# Patient Record
Sex: MALE | Race: WHITE | Employment: OTHER | URBAN - METROPOLITAN AREA
[De-identification: names, ages, dates, MRNs, and addresses within clinical notes are randomized per-mention and may not be internally consistent; named-entity substitution may affect disease eponyms.]

---

## 2024-10-03 ENCOUNTER — HOSPITAL ENCOUNTER (OUTPATIENT)
Dept: GENERAL RADIOLOGY | Age: 79
Discharge: HOME | End: 2024-10-06
Payer: MEDICARE

## 2024-10-03 ENCOUNTER — HOSPITAL ENCOUNTER (OUTPATIENT)
Dept: ULTRASOUND IMAGING | Age: 79
Discharge: HOME | End: 2024-10-06
Payer: MEDICARE

## 2024-10-03 DIAGNOSIS — I70.202 OCCLUSION OF LEFT FEMORAL ARTERY (HCC): ICD-10-CM

## 2024-10-03 PROCEDURE — 93922 UPR/L XTREMITY ART 2 LEVELS: CPT

## 2024-10-04 PROCEDURE — 93922 UPR/L XTREMITY ART 2 LEVELS: CPT | Performed by: RADIOLOGY

## 2024-10-05 ENCOUNTER — HOSPITAL ENCOUNTER (OUTPATIENT)
Dept: GENERAL RADIOLOGY | Age: 79
Discharge: HOME | End: 2024-10-08
Payer: MEDICARE

## 2024-10-05 DIAGNOSIS — M79.672 LEFT FOOT PAIN: ICD-10-CM

## 2024-10-05 PROCEDURE — 73620 X-RAY EXAM OF FOOT: CPT

## 2025-06-20 ENCOUNTER — HOSPITAL ENCOUNTER (OUTPATIENT)
Age: 80
Setting detail: OBSERVATION
Discharge: HOME | End: 2025-06-24
Attending: EMERGENCY MEDICINE | Admitting: FAMILY MEDICINE
Payer: MEDICARE

## 2025-06-20 DIAGNOSIS — Z79.01 CHRONIC ANTICOAGULATION: ICD-10-CM

## 2025-06-20 DIAGNOSIS — I48.0 PAROXYSMAL A-FIB (HCC): ICD-10-CM

## 2025-06-20 DIAGNOSIS — K62.5 RECTAL BLEEDING: Primary | ICD-10-CM

## 2025-06-20 PROBLEM — I10 PRIMARY HYPERTENSION: Chronic | Status: ACTIVE | Noted: 2025-06-20

## 2025-06-20 PROBLEM — K92.1 MELENA: Status: ACTIVE | Noted: 2025-06-20

## 2025-06-20 PROBLEM — N18.31 CKD STAGE 3A, GFR 45-59 ML/MIN (HCC): Status: ACTIVE | Noted: 2025-06-20

## 2025-06-20 PROBLEM — E78.5 HYPERLIPIDEMIA: Chronic | Status: ACTIVE | Noted: 2025-06-20

## 2025-06-20 PROBLEM — E11.9 DIABETES MELLITUS TYPE 2 IN NONOBESE (HCC): Chronic | Status: ACTIVE | Noted: 2025-06-20

## 2025-06-20 PROBLEM — N18.31 CKD STAGE 3A, GFR 45-59 ML/MIN (HCC): Chronic | Status: ACTIVE | Noted: 2025-06-20

## 2025-06-20 LAB
ALBUMIN SERPL-MCNC: 3.6 G/DL (ref 3.2–4.6)
ALBUMIN/GLOB SERPL: 1.3 (ref 1–1.9)
ALP SERPL-CCNC: 110 U/L (ref 40–129)
ALT SERPL-CCNC: 14 U/L (ref 8–55)
ANION GAP SERPL CALC-SCNC: 12 MMOL/L (ref 7–16)
AST SERPL-CCNC: 20 U/L (ref 15–37)
BASOPHILS # BLD: 0.05 K/UL (ref 0–0.2)
BASOPHILS NFR BLD: 0.5 % (ref 0–2)
BILIRUB SERPL-MCNC: 0.6 MG/DL (ref 0–1.2)
BUN SERPL-MCNC: 38 MG/DL (ref 8–23)
CALCIUM SERPL-MCNC: 10.1 MG/DL (ref 8.8–10.2)
CHLORIDE SERPL-SCNC: 111 MMOL/L (ref 98–107)
CO2 SERPL-SCNC: 19 MMOL/L (ref 20–29)
CREAT SERPL-MCNC: 1.54 MG/DL (ref 0.8–1.3)
DIFFERENTIAL METHOD BLD: ABNORMAL
EOSINOPHIL # BLD: 0.23 K/UL (ref 0–0.8)
EOSINOPHIL NFR BLD: 2.3 % (ref 0.5–7.8)
ERYTHROCYTE [DISTWIDTH] IN BLOOD BY AUTOMATED COUNT: 16.6 % (ref 11.9–14.6)
FERRITIN SERPL-MCNC: 22 NG/ML (ref 8–388)
GLOBULIN SER CALC-MCNC: 2.7 G/DL (ref 2.3–3.5)
GLUCOSE BLD STRIP.AUTO-MCNC: 77 MG/DL (ref 65–100)
GLUCOSE SERPL-MCNC: 95 MG/DL (ref 70–99)
HCT VFR BLD AUTO: 40.4 % (ref 41.1–50.3)
HGB BLD-MCNC: 13 G/DL (ref 13.6–17.2)
IMM GRANULOCYTES # BLD AUTO: 0.06 K/UL (ref 0–0.5)
IMM GRANULOCYTES NFR BLD AUTO: 0.6 % (ref 0–5)
INR PPP: 1.2
IRON SATN MFR SERPL: 9 % (ref 20–50)
IRON SERPL-MCNC: 44 UG/DL (ref 35–100)
LYMPHOCYTES # BLD: 2.27 K/UL (ref 0.5–4.6)
LYMPHOCYTES NFR BLD: 23.2 % (ref 13–44)
MCH RBC QN AUTO: 26.6 PG (ref 26.1–32.9)
MCHC RBC AUTO-ENTMCNC: 32.2 G/DL (ref 31.4–35)
MCV RBC AUTO: 82.6 FL (ref 82–102)
MONOCYTES # BLD: 0.75 K/UL (ref 0.1–1.3)
MONOCYTES NFR BLD: 7.7 % (ref 4–12)
NEUTS SEG # BLD: 6.43 K/UL (ref 1.7–8.2)
NEUTS SEG NFR BLD: 65.7 % (ref 43–78)
NRBC # BLD: 0 K/UL (ref 0–0.2)
PLATELET # BLD AUTO: 158 K/UL (ref 150–450)
PMV BLD AUTO: 10.8 FL (ref 9.4–12.3)
POTASSIUM SERPL-SCNC: 4.7 MMOL/L (ref 3.5–5.1)
PROT SERPL-MCNC: 6.3 G/DL (ref 6.3–8.2)
PROTHROMBIN TIME: 15.7 SEC (ref 11.3–14.9)
RBC # BLD AUTO: 4.89 M/UL (ref 4.23–5.6)
SERVICE CMNT-IMP: NORMAL
SODIUM SERPL-SCNC: 142 MMOL/L (ref 136–145)
TIBC SERPL-MCNC: 465 UG/DL (ref 240–450)
UIBC SERPL-MCNC: 421 UG/DL (ref 112–347)
WBC # BLD AUTO: 9.8 K/UL (ref 4.3–11.1)

## 2025-06-20 PROCEDURE — 82962 GLUCOSE BLOOD TEST: CPT

## 2025-06-20 PROCEDURE — 93005 ELECTROCARDIOGRAM TRACING: CPT | Performed by: EMERGENCY MEDICINE

## 2025-06-20 PROCEDURE — 82728 ASSAY OF FERRITIN: CPT

## 2025-06-20 PROCEDURE — 2500000003 HC RX 250 WO HCPCS: Performed by: EMERGENCY MEDICINE

## 2025-06-20 PROCEDURE — 86901 BLOOD TYPING SEROLOGIC RH(D): CPT

## 2025-06-20 PROCEDURE — 2580000003 HC RX 258: Performed by: EMERGENCY MEDICINE

## 2025-06-20 PROCEDURE — 99285 EMERGENCY DEPT VISIT HI MDM: CPT

## 2025-06-20 PROCEDURE — 96374 THER/PROPH/DIAG INJ IV PUSH: CPT

## 2025-06-20 PROCEDURE — 86923 COMPATIBILITY TEST ELECTRIC: CPT

## 2025-06-20 PROCEDURE — G0378 HOSPITAL OBSERVATION PER HR: HCPCS

## 2025-06-20 PROCEDURE — 86850 RBC ANTIBODY SCREEN: CPT

## 2025-06-20 PROCEDURE — 6360000002 HC RX W HCPCS: Performed by: EMERGENCY MEDICINE

## 2025-06-20 PROCEDURE — 83550 IRON BINDING TEST: CPT

## 2025-06-20 PROCEDURE — 85025 COMPLETE CBC W/AUTO DIFF WBC: CPT

## 2025-06-20 PROCEDURE — 80053 COMPREHEN METABOLIC PANEL: CPT

## 2025-06-20 PROCEDURE — 83540 ASSAY OF IRON: CPT

## 2025-06-20 PROCEDURE — 85610 PROTHROMBIN TIME: CPT

## 2025-06-20 PROCEDURE — 94761 N-INVAS EAR/PLS OXIMETRY MLT: CPT

## 2025-06-20 PROCEDURE — 86900 BLOOD TYPING SEROLOGIC ABO: CPT

## 2025-06-20 RX ORDER — DEXTROSE MONOHYDRATE 100 MG/ML
INJECTION, SOLUTION INTRAVENOUS CONTINUOUS PRN
Status: DISCONTINUED | OUTPATIENT
Start: 2025-06-20 | End: 2025-06-24 | Stop reason: HOSPADM

## 2025-06-20 RX ORDER — TAMSULOSIN HYDROCHLORIDE 0.4 MG/1
0.8 CAPSULE ORAL DAILY
Status: DISCONTINUED | OUTPATIENT
Start: 2025-06-21 | End: 2025-06-24 | Stop reason: HOSPADM

## 2025-06-20 RX ORDER — 0.9 % SODIUM CHLORIDE 0.9 %
1000 INTRAVENOUS SOLUTION INTRAVENOUS ONCE
Status: COMPLETED | OUTPATIENT
Start: 2025-06-20 | End: 2025-06-20

## 2025-06-20 RX ORDER — PROBENECID 500 MG/1
500 TABLET, FILM COATED ORAL DAILY
COMMUNITY

## 2025-06-20 RX ORDER — ACETAMINOPHEN 650 MG/1
650 SUPPOSITORY RECTAL EVERY 6 HOURS PRN
Status: DISCONTINUED | OUTPATIENT
Start: 2025-06-20 | End: 2025-06-24 | Stop reason: HOSPADM

## 2025-06-20 RX ORDER — DAPAGLIFLOZIN 10 MG/1
10 TABLET, FILM COATED ORAL EVERY MORNING
COMMUNITY

## 2025-06-20 RX ORDER — POTASSIUM CHLORIDE 750 MG/1
10 CAPSULE, EXTENDED RELEASE ORAL 2 TIMES DAILY
COMMUNITY

## 2025-06-20 RX ORDER — DILTIAZEM HYDROCHLORIDE 120 MG/1
120 CAPSULE, COATED, EXTENDED RELEASE ORAL DAILY
Status: DISCONTINUED | OUTPATIENT
Start: 2025-06-21 | End: 2025-06-24 | Stop reason: HOSPADM

## 2025-06-20 RX ORDER — CLONAZEPAM 1 MG/1
1 TABLET ORAL 2 TIMES DAILY PRN
Status: DISCONTINUED | OUTPATIENT
Start: 2025-06-20 | End: 2025-06-24 | Stop reason: HOSPADM

## 2025-06-20 RX ORDER — METFORMIN HYDROCHLORIDE 500 MG/1
500 TABLET, EXTENDED RELEASE ORAL
COMMUNITY

## 2025-06-20 RX ORDER — ERGOCALCIFEROL 1.25 MG/1
50000 CAPSULE, LIQUID FILLED ORAL WEEKLY
COMMUNITY

## 2025-06-20 RX ORDER — ONDANSETRON 2 MG/ML
4 INJECTION INTRAMUSCULAR; INTRAVENOUS EVERY 6 HOURS PRN
Status: DISCONTINUED | OUTPATIENT
Start: 2025-06-20 | End: 2025-06-24 | Stop reason: HOSPADM

## 2025-06-20 RX ORDER — CLONAZEPAM 1 MG/1
1 TABLET ORAL 2 TIMES DAILY PRN
COMMUNITY

## 2025-06-20 RX ORDER — GLIPIZIDE 5 MG/1
5 TABLET, FILM COATED, EXTENDED RELEASE ORAL DAILY
COMMUNITY

## 2025-06-20 RX ORDER — NADOLOL 40 MG/1
40 TABLET ORAL DAILY
Status: DISCONTINUED | OUTPATIENT
Start: 2025-06-21 | End: 2025-06-24 | Stop reason: HOSPADM

## 2025-06-20 RX ORDER — SODIUM CHLORIDE 0.9 % (FLUSH) 0.9 %
5-40 SYRINGE (ML) INJECTION PRN
Status: DISCONTINUED | OUTPATIENT
Start: 2025-06-20 | End: 2025-06-24 | Stop reason: HOSPADM

## 2025-06-20 RX ORDER — ACETAMINOPHEN 325 MG/1
650 TABLET ORAL EVERY 6 HOURS PRN
Status: DISCONTINUED | OUTPATIENT
Start: 2025-06-20 | End: 2025-06-24 | Stop reason: HOSPADM

## 2025-06-20 RX ORDER — INSULIN LISPRO 100 [IU]/ML
0-4 INJECTION, SOLUTION INTRAVENOUS; SUBCUTANEOUS
Status: DISCONTINUED | OUTPATIENT
Start: 2025-06-20 | End: 2025-06-24 | Stop reason: HOSPADM

## 2025-06-20 RX ORDER — LOSARTAN POTASSIUM 50 MG/1
50 TABLET ORAL DAILY
Status: DISCONTINUED | OUTPATIENT
Start: 2025-06-21 | End: 2025-06-24 | Stop reason: HOSPADM

## 2025-06-20 RX ORDER — TAMSULOSIN HYDROCHLORIDE 0.4 MG/1
0.8 CAPSULE ORAL DAILY
COMMUNITY

## 2025-06-20 RX ORDER — ONDANSETRON 4 MG/1
4 TABLET, ORALLY DISINTEGRATING ORAL EVERY 6 HOURS PRN
Status: DISCONTINUED | OUTPATIENT
Start: 2025-06-20 | End: 2025-06-24 | Stop reason: HOSPADM

## 2025-06-20 RX ORDER — SODIUM CHLORIDE 0.9 % (FLUSH) 0.9 %
5-40 SYRINGE (ML) INJECTION EVERY 12 HOURS SCHEDULED
Status: DISCONTINUED | OUTPATIENT
Start: 2025-06-20 | End: 2025-06-24 | Stop reason: HOSPADM

## 2025-06-20 RX ORDER — NADOLOL 20 MG/1
20 TABLET ORAL 2 TIMES DAILY
COMMUNITY

## 2025-06-20 RX ORDER — LOSARTAN POTASSIUM 50 MG/1
50 TABLET ORAL DAILY
COMMUNITY

## 2025-06-20 RX ORDER — CLONIDINE HYDROCHLORIDE 0.1 MG/1
0.1 TABLET ORAL 2 TIMES DAILY
COMMUNITY

## 2025-06-20 RX ORDER — IBUPROFEN 600 MG/1
1 TABLET ORAL PRN
Status: DISCONTINUED | OUTPATIENT
Start: 2025-06-20 | End: 2025-06-24 | Stop reason: HOSPADM

## 2025-06-20 RX ORDER — SODIUM CHLORIDE 9 MG/ML
INJECTION, SOLUTION INTRAVENOUS PRN
Status: DISCONTINUED | OUTPATIENT
Start: 2025-06-20 | End: 2025-06-24 | Stop reason: HOSPADM

## 2025-06-20 RX ORDER — GLIPIZIDE 5 MG/1
5 TABLET ORAL
Status: DISCONTINUED | OUTPATIENT
Start: 2025-06-21 | End: 2025-06-24 | Stop reason: HOSPADM

## 2025-06-20 RX ORDER — METFORMIN HYDROCHLORIDE 500 MG/1
500 TABLET, EXTENDED RELEASE ORAL
Status: DISCONTINUED | OUTPATIENT
Start: 2025-06-21 | End: 2025-06-24 | Stop reason: HOSPADM

## 2025-06-20 RX ORDER — ROSUVASTATIN CALCIUM 10 MG/1
20 TABLET, COATED ORAL DAILY
Status: DISCONTINUED | OUTPATIENT
Start: 2025-06-21 | End: 2025-06-24 | Stop reason: HOSPADM

## 2025-06-20 RX ORDER — DILTIAZEM HYDROCHLORIDE 120 MG/1
120 CAPSULE, EXTENDED RELEASE ORAL DAILY
COMMUNITY

## 2025-06-20 RX ORDER — ROSUVASTATIN CALCIUM 20 MG/1
20 TABLET, COATED ORAL DAILY
COMMUNITY

## 2025-06-20 RX ADMIN — SODIUM CHLORIDE, PRESERVATIVE FREE 80 MG: 5 INJECTION INTRAVENOUS at 21:36

## 2025-06-20 RX ADMIN — SODIUM CHLORIDE 1000 ML: 0.9 INJECTION, SOLUTION INTRAVENOUS at 21:35

## 2025-06-20 ASSESSMENT — PAIN SCALES - GENERAL: PAINLEVEL_OUTOF10: 0

## 2025-06-20 ASSESSMENT — PAIN - FUNCTIONAL ASSESSMENT: PAIN_FUNCTIONAL_ASSESSMENT: 0-10

## 2025-06-20 ASSESSMENT — LIFESTYLE VARIABLES
HOW OFTEN DO YOU HAVE A DRINK CONTAINING ALCOHOL: 2-4 TIMES A MONTH
HOW MANY STANDARD DRINKS CONTAINING ALCOHOL DO YOU HAVE ON A TYPICAL DAY: 1 OR 2

## 2025-06-20 NOTE — ED TRIAGE NOTES
Patient arrived with a complaint of rectal bleeding for 2 days. Patient reports of dark blood and clots present. Patient reports having his parts of his colon removed post colonoscopy. Patient reports of urine frequency as well. Feeling dizzy, weak and tired. Patient reports of feeling full and going to the bathroom.     Denies chest pain,  Denies  n/v  Denies abdominal pain.

## 2025-06-20 NOTE — ED NOTES
Assumed care of pt at this time. Pt resting quietly in bed, attached to bedside monitor. Warm blankets provided. Pt denies questions and concerns at this time.        Bernie Silva RN  06/20/25 1953

## 2025-06-21 ENCOUNTER — ANESTHESIA EVENT (OUTPATIENT)
Dept: SURGERY | Age: 80
End: 2025-06-21
Payer: MEDICARE

## 2025-06-21 LAB
ANION GAP SERPL CALC-SCNC: 14 MMOL/L (ref 7–16)
BASOPHILS # BLD: 0.03 K/UL (ref 0–0.2)
BASOPHILS NFR BLD: 0.5 % (ref 0–2)
BUN SERPL-MCNC: 34 MG/DL (ref 8–23)
CALCIUM SERPL-MCNC: 9.2 MG/DL (ref 8.8–10.2)
CHLORIDE SERPL-SCNC: 114 MMOL/L (ref 98–107)
CO2 SERPL-SCNC: 13 MMOL/L (ref 20–29)
CREAT SERPL-MCNC: 1.44 MG/DL (ref 0.8–1.3)
DIFFERENTIAL METHOD BLD: ABNORMAL
EKG ATRIAL RATE: 0 BPM
EKG DIAGNOSIS: NORMAL
EKG Q-T INTERVAL: 386 MS
EKG QRS DURATION: 101 MS
EKG QTC CALCULATION (BAZETT): 408 MS
EKG R AXIS: -61 DEGREES
EKG T AXIS: 218 DEGREES
EKG VENTRICULAR RATE: 67 BPM
EOSINOPHIL # BLD: 0.15 K/UL (ref 0–0.8)
EOSINOPHIL NFR BLD: 2.3 % (ref 0.5–7.8)
ERYTHROCYTE [DISTWIDTH] IN BLOOD BY AUTOMATED COUNT: 16.2 % (ref 11.9–14.6)
EST. AVERAGE GLUCOSE BLD GHB EST-MCNC: 164 MG/DL
GLUCOSE BLD STRIP.AUTO-MCNC: 102 MG/DL (ref 65–100)
GLUCOSE BLD STRIP.AUTO-MCNC: 128 MG/DL (ref 65–100)
GLUCOSE BLD STRIP.AUTO-MCNC: 134 MG/DL (ref 65–100)
GLUCOSE BLD STRIP.AUTO-MCNC: 134 MG/DL (ref 65–100)
GLUCOSE BLD STRIP.AUTO-MCNC: 220 MG/DL (ref 65–100)
GLUCOSE SERPL-MCNC: 136 MG/DL (ref 70–99)
HBA1C MFR BLD: 7.4 % (ref 0–5.6)
HCT VFR BLD AUTO: 23.9 % (ref 41.1–50.3)
HCT VFR BLD AUTO: 25.9 % (ref 41.1–50.3)
HCT VFR BLD AUTO: 28 % (ref 41.1–50.3)
HCT VFR BLD AUTO: 28.5 % (ref 41.1–50.3)
HGB BLD-MCNC: 7.9 G/DL (ref 13.6–17.2)
HGB BLD-MCNC: 8.2 G/DL (ref 13.6–17.2)
HGB BLD-MCNC: 9.1 G/DL (ref 13.6–17.2)
HGB BLD-MCNC: 9.2 G/DL (ref 13.6–17.2)
IMM GRANULOCYTES # BLD AUTO: 0.04 K/UL (ref 0–0.5)
IMM GRANULOCYTES NFR BLD AUTO: 0.6 % (ref 0–5)
LYMPHOCYTES # BLD: 1.68 K/UL (ref 0.5–4.6)
LYMPHOCYTES NFR BLD: 25.7 % (ref 13–44)
MAGNESIUM SERPL-MCNC: 2 MG/DL (ref 1.8–2.4)
MCH RBC QN AUTO: 26.7 PG (ref 26.1–32.9)
MCHC RBC AUTO-ENTMCNC: 32.5 G/DL (ref 31.4–35)
MCV RBC AUTO: 82.1 FL (ref 82–102)
MONOCYTES # BLD: 0.53 K/UL (ref 0.1–1.3)
MONOCYTES NFR BLD: 8.1 % (ref 4–12)
NEUTS SEG # BLD: 4.1 K/UL (ref 1.7–8.2)
NEUTS SEG NFR BLD: 62.8 % (ref 43–78)
NRBC # BLD: 0 K/UL (ref 0–0.2)
PLATELET # BLD AUTO: 130 K/UL (ref 150–450)
PMV BLD AUTO: 10.9 FL (ref 9.4–12.3)
POTASSIUM SERPL-SCNC: 4.9 MMOL/L (ref 3.5–5.1)
RBC # BLD AUTO: 3.41 M/UL (ref 4.23–5.6)
SERVICE CMNT-IMP: ABNORMAL
SODIUM SERPL-SCNC: 141 MMOL/L (ref 136–145)
WBC # BLD AUTO: 6.5 K/UL (ref 4.3–11.1)

## 2025-06-21 PROCEDURE — 83036 HEMOGLOBIN GLYCOSYLATED A1C: CPT

## 2025-06-21 PROCEDURE — 85018 HEMOGLOBIN: CPT

## 2025-06-21 PROCEDURE — 85025 COMPLETE CBC W/AUTO DIFF WBC: CPT

## 2025-06-21 PROCEDURE — G0378 HOSPITAL OBSERVATION PER HR: HCPCS

## 2025-06-21 PROCEDURE — 2500000003 HC RX 250 WO HCPCS: Performed by: FAMILY MEDICINE

## 2025-06-21 PROCEDURE — 85014 HEMATOCRIT: CPT

## 2025-06-21 PROCEDURE — 2500000003 HC RX 250 WO HCPCS: Performed by: PHYSICIAN ASSISTANT

## 2025-06-21 PROCEDURE — 80048 BASIC METABOLIC PNL TOTAL CA: CPT

## 2025-06-21 PROCEDURE — 96376 TX/PRO/DX INJ SAME DRUG ADON: CPT

## 2025-06-21 PROCEDURE — 99222 1ST HOSP IP/OBS MODERATE 55: CPT | Performed by: INTERNAL MEDICINE

## 2025-06-21 PROCEDURE — 6370000000 HC RX 637 (ALT 250 FOR IP): Performed by: INTERNAL MEDICINE

## 2025-06-21 PROCEDURE — 83735 ASSAY OF MAGNESIUM: CPT

## 2025-06-21 PROCEDURE — 96375 TX/PRO/DX INJ NEW DRUG ADDON: CPT

## 2025-06-21 PROCEDURE — 6360000002 HC RX W HCPCS: Performed by: FAMILY MEDICINE

## 2025-06-21 PROCEDURE — 36415 COLL VENOUS BLD VENIPUNCTURE: CPT

## 2025-06-21 PROCEDURE — 93010 ELECTROCARDIOGRAM REPORT: CPT | Performed by: INTERNAL MEDICINE

## 2025-06-21 PROCEDURE — 6370000000 HC RX 637 (ALT 250 FOR IP): Performed by: FAMILY MEDICINE

## 2025-06-21 PROCEDURE — 82962 GLUCOSE BLOOD TEST: CPT

## 2025-06-21 RX ORDER — SODIUM CHLORIDE 9 MG/ML
INJECTION, SOLUTION INTRAVENOUS PRN
Status: CANCELLED | OUTPATIENT
Start: 2025-06-21

## 2025-06-21 RX ORDER — ONDANSETRON 2 MG/ML
4 INJECTION INTRAMUSCULAR; INTRAVENOUS
Status: CANCELLED | OUTPATIENT
Start: 2025-06-21

## 2025-06-21 RX ORDER — SODIUM CHLORIDE, SODIUM LACTATE, POTASSIUM CHLORIDE, CALCIUM CHLORIDE 600; 310; 30; 20 MG/100ML; MG/100ML; MG/100ML; MG/100ML
INJECTION, SOLUTION INTRAVENOUS CONTINUOUS
Status: CANCELLED | OUTPATIENT
Start: 2025-06-21

## 2025-06-21 RX ORDER — SODIUM CHLORIDE 0.9 % (FLUSH) 0.9 %
5-40 SYRINGE (ML) INJECTION EVERY 12 HOURS SCHEDULED
Status: CANCELLED | OUTPATIENT
Start: 2025-06-21

## 2025-06-21 RX ORDER — IBUPROFEN 600 MG/1
1 TABLET ORAL PRN
Status: CANCELLED | OUTPATIENT
Start: 2025-06-21

## 2025-06-21 RX ORDER — SODIUM CHLORIDE 0.9 % (FLUSH) 0.9 %
5-40 SYRINGE (ML) INJECTION PRN
Status: CANCELLED | OUTPATIENT
Start: 2025-06-21

## 2025-06-21 RX ORDER — INDOMETHACIN 25 MG/1
50 CAPSULE ORAL ONCE
Status: COMPLETED | OUTPATIENT
Start: 2025-06-21 | End: 2025-06-21

## 2025-06-21 RX ORDER — NALOXONE HYDROCHLORIDE 0.4 MG/ML
INJECTION, SOLUTION INTRAMUSCULAR; INTRAVENOUS; SUBCUTANEOUS PRN
Status: CANCELLED | OUTPATIENT
Start: 2025-06-21

## 2025-06-21 RX ORDER — LIDOCAINE HYDROCHLORIDE 10 MG/ML
1 INJECTION, SOLUTION INFILTRATION; PERINEURAL
Status: CANCELLED | OUTPATIENT
Start: 2025-06-21

## 2025-06-21 RX ORDER — DEXTROSE MONOHYDRATE 100 MG/ML
INJECTION, SOLUTION INTRAVENOUS CONTINUOUS PRN
Status: CANCELLED | OUTPATIENT
Start: 2025-06-21

## 2025-06-21 RX ADMIN — TAMSULOSIN HYDROCHLORIDE 0.8 MG: 0.4 CAPSULE ORAL at 09:08

## 2025-06-21 RX ADMIN — DILTIAZEM HYDROCHLORIDE 120 MG: 120 CAPSULE, EXTENDED RELEASE ORAL at 09:09

## 2025-06-21 RX ADMIN — LOSARTAN POTASSIUM 50 MG: 50 TABLET, FILM COATED ORAL at 09:08

## 2025-06-21 RX ADMIN — SODIUM CHLORIDE 40 MG: 9 INJECTION INTRAMUSCULAR; INTRAVENOUS; SUBCUTANEOUS at 09:07

## 2025-06-21 RX ADMIN — POLYETHYLENE GLYCOL-3350 AND ELECTROLYTES 4000 ML: 236; 6.74; 5.86; 2.97; 22.74 POWDER, FOR SOLUTION ORAL at 16:00

## 2025-06-21 RX ADMIN — NADOLOL 40 MG: 40 TABLET ORAL at 09:09

## 2025-06-21 RX ADMIN — SODIUM CHLORIDE, PRESERVATIVE FREE 10 ML: 5 INJECTION INTRAVENOUS at 09:25

## 2025-06-21 RX ADMIN — INSULIN LISPRO 1 UNITS: 100 INJECTION, SOLUTION INTRAVENOUS; SUBCUTANEOUS at 20:56

## 2025-06-21 RX ADMIN — SODIUM CHLORIDE 40 MG: 9 INJECTION INTRAMUSCULAR; INTRAVENOUS; SUBCUTANEOUS at 20:56

## 2025-06-21 RX ADMIN — ROSUVASTATIN CALCIUM 20 MG: 10 TABLET, FILM COATED ORAL at 09:08

## 2025-06-21 RX ADMIN — SODIUM BICARBONATE 50 MEQ: 84 INJECTION INTRAVENOUS at 12:48

## 2025-06-21 RX ADMIN — SODIUM CHLORIDE, PRESERVATIVE FREE 10 ML: 5 INJECTION INTRAVENOUS at 21:01

## 2025-06-21 NOTE — ED NOTES
TRANSFER - OUT REPORT:    Verbal report given to Felipe Méndez on [place]  being transferred to 6th floor for routine progression of patient care       Report consisted of patient's Situation, Background, Assessment and   Recommendations(SBAR).     Information from the following report(s) Nurse Handoff Report, ED Encounter Summary, ED SBAR, MAR, Recent Results, and Neuro Assessment was reviewed with the receiving nurse.    [place] Fall Assessment:    Presents to emergency department  because of falls (Syncope, seizure, or loss of consciousness): No  Age > 70: Yes  Altered Mental Status, Intoxication with alcohol or substance confusion (Disorientation, impaired judgment, poor safety awaremess, or inability to follow instructions): No  Impaired Mobility: Ambulates or transfers with assistive devices or assistance; Unable to ambulate or transer.: Yes  Nursing Judgement: Yes          Lines:   Peripheral IV 06/20/25 Right Forearm (Active)   Site Assessment Clean, dry & intact 06/20/25 2206   Line Status Flushed 06/20/25 2206   Line Care Connections checked and tightened 06/20/25 2206   Phlebitis Assessment No symptoms 06/20/25 2206   Infiltration Assessment 0 06/20/25 2206   Alcohol Cap Used Yes 06/20/25 2206   Dressing Status Clean, dry & intact 06/20/25 2206   Dressing Type Transparent 06/20/25 2206        Opportunity for questions and clarification was provided.      Patient transported with:  Bernie Adams RN  06/20/25 2212

## 2025-06-21 NOTE — PROGRESS NOTES
Hospitalist Progress Note   Admit Date:  2025  7:19 PM   Name:  Can Land   Age:  79 y.o.  Sex:  male  :  1945   MRN:  [id number]   Room:  [facility]/    Presenting/Chief Complaint: Rectal Bleeding     Reason(s) for Admission: Melena [K92.1]  Rectal bleeding [K62.5]  Chronic anticoagulation [Z79.01]  Paroxysmal A-fib (HCC) [I48.0]     Hospital Course:   Can Land is a 79 y.o. male with medical history of atrial fibrillation on apixaban, hypertension, HLD, DM2, CKD 3a, anxiety, BPH c/o melenotic stooling x 2 days.  Patient has associated dizziness and weakness. He denied hx of GIB, gastric ulcers; further denied hematemesis, coffee-grind emesis, hematochezia.    In ER, VS with T97.2, HR 77, RR 17, /93, 99% RA.  Labwork remarkable for CO2 19, creatinine 1.54.  H/h 13.0 / 40.4 respectively on CBC.  Hospitalist consulted for inpatient admission.     Subjective & 24hr Events:   \"I'm still dizzy; so what's the plan?\"  79y.o. WM sitting up in bed     Admits to dizziness and weakness; RN reports melanotic BM this AM.  Denies CP, dyspnea, HA, visual deficits.    Assessment & Plan:     Principal Problem:    Melena  - OAC held  - appreciate GI evaluation, plans for EGD / C-scope in AM  - transfuse if hgb < 7.0    Active Problems:    Atrial fibrillation / secondary hypercoagulable state  - apixaban held  - rate ~controlled       Hypertension  - BP ranges improved  - cont losartan as dosed      DM2  - holding metformin / glipizide  - sliding scale coverage as needed      CKD stage 3a, GFR 45-59 ml/min ([facility])  - avoid nephrotoxic rx  - metformin held      BPH  - flomax as dosed      Anticipated Discharge Arrangements:   Home    PT/OT evals ordered?  Not ordered; patient not expected to need rehab  Diet:  ADULT DIET; Clear Liquid; 3 carb choices (45 gm/meal)  VTE prophylaxis: SCD  Code status: Full Code      Non-peripheral Lines and Tubes (if present):          Telemetry (if

## 2025-06-21 NOTE — PLAN OF CARE
Problem: Chronic Conditions and Co-morbidities  Goal: Patient's chronic conditions and co-morbidity symptoms are monitored and maintained or improved  6/21/2025 1953 by Dev Franco RN  Outcome: Progressing  6/21/2025 0842 by Israel Gipson RN  Outcome: Progressing     Problem: Discharge Planning  Goal: Discharge to home or other facility with appropriate resources  6/21/2025 1953 by Dev Franco RN  Outcome: Progressing  6/21/2025 0842 by Israel Gipson RN  Outcome: Progressing     Problem: Pain  Goal: Verbalizes/displays adequate comfort level or baseline comfort level  6/21/2025 1953 by Dev Franco RN  Outcome: Progressing  6/21/2025 0842 by Israel Gipson RN  Outcome: Progressing     Problem: Safety - Adult  Goal: Free from fall injury  6/21/2025 1953 by Dev Franco RN  Outcome: Progressing  6/21/2025 0842 by Israel Gipson RN  Outcome: Progressing     Problem: Cardiovascular - Adult  Goal: Maintains optimal cardiac output and hemodynamic stability  6/21/2025 1953 by Dev Franco RN  Outcome: Progressing  6/21/2025 0842 by Israel Gipson RN  Outcome: Progressing     Problem: Skin/Tissue Integrity - Adult  Goal: Skin integrity remains intact  6/21/2025 1953 by Dev Franco RN  Outcome: Progressing  6/21/2025 0842 by Israel Gipson RN  Outcome: Progressing  Flowsheets (Taken 6/21/2025 0710)  Skin Integrity Remains Intact: Monitor for areas of redness and/or skin breakdown     Problem: Musculoskeletal - Adult  Goal: Return mobility to safest level of function  6/21/2025 1953 by Dev Franco RN  Outcome: Progressing  6/21/2025 0842 by Israel Gipson RN  Outcome: Progressing  Goal: Maintain proper alignment of affected body part  6/21/2025 1953 by Dev Franco RN  Outcome: Progressing  6/21/2025 0842 by Israel Gipson RN  Outcome: Progressing  Goal: Return ADL status to a safe level of function  6/21/2025 1953 by Dev Franco RN  Outcome: Progressing  6/21/2025 0842 by

## 2025-06-21 NOTE — CONSULTS
GASTROENTEROLOGY & INTERVENTIONAL ENDOSCOPY        Consult Requested By: Dr. Schwartz  Reason for Consult: GI Bleed  Primary GI MD: N/AAdmission date: 2025        Date of service: 2025      Patient name: Can Land  : 1945  MRN: [id number]    Subjective     History of Present Illness  Presents with complaint of multiple episodes of dark maroon blood per rectum over the past 2 days. Patient denies any abdominal pain. He has not had diarrhea. He reports never having this before. He is on Eliquis for paroxysmal A-fib.     He reports has been feeling very weak no nausea or vomiting.  No abdominal pain.  Having rectal bleeding.  Denies having bleeding like this previously.  Last colonoscopy was in  no pathology was noted per patient.  Her bright red episode of bleeding this morning        Patient denied any fever, chills, no chest pain, no SOB, no changes in urine color, no easy bruising, no neurological deficits.    Endoscopic studies:  Previous Colonoscopy: Yes   Previous EGD: No    GI Imaging studies:   -    Aspirin use: Yes    NSAID use: No  Plavix/Brillinta: No  Anticoagulation (warfarin or Eliquis or Xarelto or Pradaxa or any other): Yes        GI ROS:  Nausea:No  Vomiting:No  Heartburn/acid reflux:No  Abdominal pain: No  Chest pain: No  Dysphagia: No  Odynophagia: No  Diarrhea:No  Constipation:No  Incontinence of stool :No  Changes in bowel habit patterns: No  Vomiting blood or coffee ground :No  Black tarry stools/Melena: No,  Hematochezia/blood in stool:Yes  Jaundice: No  Ascites: No  Pruritis: No  Change in appetite: No  Unintentional weight loss: No  Early satiety: No      Review of Systems: A complete ROS was conducted - all pertinent positives are per the HPI, all others are negative.    No past medical history on file.    No past surgical history on file.    Social History     Social History Narrative    Not on file     Social History     Tobacco Use    Smoking status: Never

## 2025-06-21 NOTE — CONSENT
Informed Consent for Blood Component Transfusion Note    I have discussed with the patient the rationale for blood component transfusion; its benefits in treating or preventing fatigue, organ damage, or death; and its risk which includes mild transfusion reactions, rare risk of blood borne infection, or more serious but rare reactions. I have discussed the alternatives to transfusion, including the risk and consequences of not receiving transfusion. The patient had an opportunity to ask questions and had agreed to proceed with transfusion of blood components.    Electronically signed by JOSSELINE Dominguez on 6/21/25 at 4:49 PM EDT

## 2025-06-21 NOTE — H&P
Hospitalist History and Physical   Admit Date:  2025  7:19 PM   Name:  Can Land   Age:  79 y.o.  Sex:  male  :  1945   MRN:  [id number]   Room:  [facility]32/    Presenting/Chief Complaint: Rectal Bleeding     Reason(s) for Admission: Melena [K92.1]     History of Present Illness:   Can Land is a 79 y.o. male who presented to [facility] emergency department complaint of dark stools and passing blood clots for 2 days.  Symptoms associated with dizziness, weakness, and fatigue.  He denies nausea, vomiting, abdominal pain.  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  He has not had colonoscopy since that time.  He has no prior history of similar bleeding.      Vital signs on presentation were notable only for /93.  CMP was pertinent for bicarb 19, BUN 38, creatinine 1.5 (baseline 1.2-1.4).  CBC notable for hemoglobin 13.0 with normocytic normochromic indices Hb was 11.8 on 6/10/2024).  ER provider ordered 1 L normal saline bolus, Protonix 80 mg IV x 1, and requested hospitalist admission for further evaluation and management.    Assessment & Plan:     Melena/maroon stools  He will be allowed clear liquid diet this evening, then n.p.o. after midnight except for medications.  GI consultation requested.  Start Protonix 40 mg IV twice daily.  Follow H&H's every 8 hours.    Paroxysmal atrial fibrillation  Continue nadolol and diltiazem as recommended by his cardiologist and most recent office note dated 2025.  Based on review of that note, patient apparently has a history of self-modifying his medication regimen, but we will continue medications as detailed in that provider's note.  He had a Zio patch placed at that visit, and per telephone note of results on 2025, atrial fibrillation was rate controlled.  Eliquis will be held in the setting of the

## 2025-06-21 NOTE — PROGRESS NOTES
Pt did have a large bloody bowel movement last night. Pt's Hgb noted to go from 13.0 to 9.1. Pt has been NPO since midnight. Rounds performed throughout shift. Pt denies needs at this time. Bed in low position, locked and call light/personal items within reach.

## 2025-06-21 NOTE — PROGRESS NOTES
4 Eyes Skin Assessment     NAME:  Can Land  YOB: 1945  MEDICAL RECORD NUMBER:  [id number]    The patient is being assessed for  Admission    I agree that at least one RN has performed a thorough Head to Toe Skin Assessment on the patient. ALL assessment sites listed below have been assessed.      Areas assessed by both nurses:    Head, Face, Ears, Shoulders, Back, Chest, Arms, Elbows, Hands, Sacrum. Buttock, Coccyx, Ischium, and Legs. Feet and Heels        Does the Patient have a Wound? No noted wound(s)       [place] Prevention initiated by RN: No  Wound Care Orders initiated by RN: No    Pressure Injury (Stage 3,4, Unstageable, DTI, NWPT, and Complex wounds) if present, place Wound referral order by RN under : No    New Ostomies, if present place, Ostomy referral order under : No     Nurse 1 eSignature: Electronically signed by Dev Franco RN on 6/20/25 at 11:03 PM EDT    **SHARE this note so that the co-signing nurse can place an eSignature**    Nurse 2 eSignature: Electronically signed by Darshana Brown RN on 6/20/25 at 11:03 PM EDT

## 2025-06-21 NOTE — PROGRESS NOTES
TRANSFER - IN REPORT:    Verbal report received from Bernie castillo [place]  being received from ED for routine progression of patient care      Report consisted of patient's Situation, Background, Assessment and   Recommendations(SBAR).     Information from the following report(s) ED Encounter Summary and ED SBAR was reviewed with the receiving nurse.    Opportunity for questions and clarification was provided.      Assessment completed upon patient's arrival to unit and care assumed.

## 2025-06-21 NOTE — PLAN OF CARE
Problem: Chronic Conditions and Co-morbidities  Goal: Patient's chronic conditions and co-morbidity symptoms are monitored and maintained or improved  Outcome: Progressing     Problem: Discharge Planning  Goal: Discharge to home or other facility with appropriate resources  Outcome: Progressing     Problem: Pain  Goal: Verbalizes/displays adequate comfort level or baseline comfort level  Outcome: Progressing     Problem: Safety - Adult  Goal: Free from fall injury  Outcome: Progressing     Problem: Cardiovascular - Adult  Goal: Maintains optimal cardiac output and hemodynamic stability  Outcome: Progressing     Problem: Skin/Tissue Integrity - Adult  Goal: Skin integrity remains intact  Outcome: Progressing     Problem: Musculoskeletal - Adult  Goal: Return mobility to safest level of function  Outcome: Progressing  Goal: Maintain proper alignment of affected body part  Outcome: Progressing  Goal: Return ADL status to a safe level of function  Outcome: Progressing     Problem: Gastrointestinal - Adult  Goal: Maintains or returns to baseline bowel function  Outcome: Progressing  Goal: Maintains adequate nutritional intake  Outcome: Progressing     Problem: Hematologic - Adult  Goal: Maintains hematologic stability  Outcome: Progressing

## 2025-06-21 NOTE — PROGRESS NOTES
Hourly rounds performed this shift. Bed lowered and locked. Call light within reach. All needs met at this time. Bedside shift report will be given to oncoming nurse. Pt became symptomatic earlier during the shift c/o SOB and dizziness. Provider made aware. STAT H&H drawn and came back at 9.2. H&H's are now timed for Q6hrs. Pt is only symptomatic when using the bathroom. Bedside commode is in use now and pt is tolerating transport well. Pt has started [place] and tolerating.

## 2025-06-21 NOTE — ED PROVIDER NOTES
(MICRO-K) 10 MEQ extended release capsule Take 1 capsule by mouth 2 times daily      losartan (COZAAR) 50 MG tablet Take 1 tablet by mouth daily      glipiZIDE (GLUCOTROL XL) 5 MG extended release tablet Take 1 tablet by mouth daily      cloNIDine (CATAPRES) 0.1 MG tablet Take 1 tablet by mouth 2 times daily      clonazePAM (KLONOPIN) 1 MG tablet Take 1 tablet by mouth 2 times daily as needed for Anxiety. Max Daily Amount: 2 mg      metFORMIN (GLUCOPHAGE-XR) 500 MG extended release tablet Take 1 tablet by mouth daily (with breakfast)      nadolol (CORGARD) 20 MG tablet Take 1 tablet by mouth in the morning and at bedtime      tamsulosin (FLOMAX) 0.4 MG capsule Take 2 capsules by mouth daily      probenecid (BENEMID) 500 MG tablet Take 1 tablet by mouth Daily      dapagliflozin (FARXIGA) 10 MG tablet Take 1 tablet by mouth every morning      dilTIAZem (CARDIZEM 12 HR) 120 MG extended release capsule Take 1 capsule by mouth Daily      vitamin D (ERGOCALCIFEROL) 1.25 MG (43790 UT) CAPS capsule Take 1 capsule by mouth once a week      rosuvastatin (CRESTOR) 20 MG tablet Take 1 tablet by mouth daily              Results from this emergency department visit:      Results for orders placed or performed during the hospital encounter of 06/20/25   CBC with Auto Differential   Result Value Ref Range    WBC 9.8 4.3 - 11.1 K/uL    RBC 4.89 4.23 - 5.6 M/uL    Hemoglobin 13.0 (L) 13.6 - 17.2 g/dL    Hematocrit 40.4 (L) 41.1 - 50.3 %    MCV 82.6 82 - 102 FL    MCH 26.6 26.1 - 32.9 PG    MCHC 32.2 31.4 - 35.0 g/dL    RDW 16.6 (H) 11.9 - 14.6 %    Platelets 158 150 - 450 K/uL    MPV 10.8 9.4 - 12.3 FL    nRBC 0.00 0.0 - 0.2 K/uL    Differential Type AUTOMATED      Neutrophils % 65.7 43.0 - 78.0 %    Lymphocytes % 23.2 13.0 - 44.0 %    Monocytes % 7.7 4.0 - 12.0 %    Eosinophils % 2.3 0.5 - 7.8 %    Basophils % 0.5 0.0 - 2.0 %    Immature Granulocytes % 0.6 0.0 - 5.0 %    Neutrophils Absolute 6.43 1.70 - 8.20 K/UL    Lymphocytes

## 2025-06-21 NOTE — ANESTHESIA PRE PROCEDURE
Department of Anesthesiology  Preprocedure Note       Name:  Can Land   Age:  79 y.o.  :  1945                                          MRN:  [id number]         Date:  2025      Surgeon: Surgeon(s):  Zenobia Chahal MD    Procedure: Procedure(s):  ESOPHAGOGASTRODUODENOSCOPY  COLONOSCOPY DIAGNOSTIC    Medications prior to admission:   Prior to Admission medications    Medication Sig Start Date End Date Taking? Authorizing Provider   apixaban (ELIQUIS) 5 MG TABS tablet Take 1 tablet by mouth 2 times daily Indications: Atrial Fibrillation   Yes Josh Duncan MD   potassium chloride (MICRO-K) 10 MEQ extended release capsule Take 1 capsule by mouth 2 times daily   Yes Josh Duncan MD   losartan (COZAAR) 50 MG tablet Take 1 tablet by mouth daily   Yes Josh Duncan MD   glipiZIDE (GLUCOTROL XL) 5 MG extended release tablet Take 1 tablet by mouth daily   Yes Josh Duncan MD   cloNIDine (CATAPRES) 0.1 MG tablet Take 1 tablet by mouth 2 times daily   Yes Josh Duncan MD   clonazePAM (KLONOPIN) 1 MG tablet Take 1 tablet by mouth 2 times daily as needed for Anxiety. Max Daily Amount: 2 mg   Yes Josh Duncan MD   metFORMIN (GLUCOPHAGE-XR) 500 MG extended release tablet Take 1 tablet by mouth daily (with breakfast)   Yes Josh Duncan MD   nadolol (CORGARD) 20 MG tablet Take 1 tablet by mouth in the morning and at bedtime   Yes Josh Duncan MD   tamsulosin (FLOMAX) 0.4 MG capsule Take 2 capsules by mouth daily   Yes Josh Duncan MD   probenecid (BENEMID) 500 MG tablet Take 1 tablet by mouth Daily   Yes ProviderJosh MD   dapagliflozin (FARXIGA) 10 MG tablet Take 1 tablet by mouth every morning   Yes Josh Duncan MD   dilTIAZem (CARDIZEM 12 HR) 120 MG extended release capsule Take 1 capsule by mouth Daily   Yes Josh Duncan MD   vitamin D (ERGOCALCIFEROL) 1.25 MG (58120 UT) CAPS capsule Take 1 capsule by mouth 
Presents with complaint of multiple episodes of dark maroon blood per rectum over the past 2 days. Patient denies any abdominal pain. He has not had diarrhea. He reports never having this before. He is on Eliquis for paroxysmal A-fib. .   Endo/Other: Negative Endo/Other ROS                    Abdominal:             Vascular: negative vascular ROS.         Other Findings:       Anesthesia Plan        Donavan Clement MD   6/21/2025

## 2025-06-22 ENCOUNTER — ANESTHESIA (OUTPATIENT)
Dept: SURGERY | Age: 80
End: 2025-06-22
Payer: MEDICARE

## 2025-06-22 LAB
ANION GAP SERPL CALC-SCNC: 20 MMOL/L (ref 7–16)
BASOPHILS # BLD: 0.03 K/UL (ref 0–0.2)
BASOPHILS NFR BLD: 0.3 % (ref 0–2)
BUN SERPL-MCNC: 37 MG/DL (ref 8–23)
CALCIUM SERPL-MCNC: 9 MG/DL (ref 8.8–10.2)
CHLORIDE SERPL-SCNC: 106 MMOL/L (ref 98–107)
CO2 SERPL-SCNC: 15 MMOL/L (ref 20–29)
CREAT SERPL-MCNC: 1.65 MG/DL (ref 0.8–1.3)
DIFFERENTIAL METHOD BLD: ABNORMAL
EOSINOPHIL # BLD: 0.04 K/UL (ref 0–0.8)
EOSINOPHIL NFR BLD: 0.4 % (ref 0.5–7.8)
ERYTHROCYTE [DISTWIDTH] IN BLOOD BY AUTOMATED COUNT: 16.3 % (ref 11.9–14.6)
GLUCOSE BLD STRIP.AUTO-MCNC: 152 MG/DL (ref 65–100)
GLUCOSE BLD STRIP.AUTO-MCNC: 179 MG/DL (ref 65–100)
GLUCOSE BLD STRIP.AUTO-MCNC: 188 MG/DL (ref 65–100)
GLUCOSE BLD STRIP.AUTO-MCNC: 260 MG/DL (ref 65–100)
GLUCOSE BLD STRIP.AUTO-MCNC: 274 MG/DL (ref 65–100)
GLUCOSE SERPL-MCNC: 124 MG/DL (ref 70–99)
HCT VFR BLD AUTO: 21.6 % (ref 41.1–50.3)
HCT VFR BLD AUTO: 26.4 % (ref 41.1–50.3)
HCT VFR BLD AUTO: 26.6 % (ref 41.1–50.3)
HGB BLD-MCNC: 7.3 G/DL (ref 13.6–17.2)
HGB BLD-MCNC: 8.3 G/DL (ref 13.6–17.2)
HGB BLD-MCNC: 8.7 G/DL (ref 13.6–17.2)
IMM GRANULOCYTES # BLD AUTO: 0.07 K/UL (ref 0–0.5)
IMM GRANULOCYTES NFR BLD AUTO: 0.8 % (ref 0–5)
LYMPHOCYTES # BLD: 2.23 K/UL (ref 0.5–4.6)
LYMPHOCYTES NFR BLD: 24.9 % (ref 13–44)
MCH RBC QN AUTO: 26.6 PG (ref 26.1–32.9)
MCHC RBC AUTO-ENTMCNC: 31.4 G/DL (ref 31.4–35)
MCV RBC AUTO: 84.6 FL (ref 82–102)
MONOCYTES # BLD: 0.93 K/UL (ref 0.1–1.3)
MONOCYTES NFR BLD: 10.4 % (ref 4–12)
NEUTS SEG # BLD: 5.66 K/UL (ref 1.7–8.2)
NEUTS SEG NFR BLD: 63.2 % (ref 43–78)
NRBC # BLD: 0 K/UL (ref 0–0.2)
PLATELET # BLD AUTO: 150 K/UL (ref 150–450)
PMV BLD AUTO: 10.5 FL (ref 9.4–12.3)
POTASSIUM SERPL-SCNC: 4 MMOL/L (ref 3.5–5.1)
RBC # BLD AUTO: 3.12 M/UL (ref 4.23–5.6)
SERVICE CMNT-IMP: ABNORMAL
SODIUM SERPL-SCNC: 141 MMOL/L (ref 136–145)
WBC # BLD AUTO: 9 K/UL (ref 4.3–11.1)

## 2025-06-22 PROCEDURE — 85018 HEMOGLOBIN: CPT

## 2025-06-22 PROCEDURE — 7100000010 HC PHASE II RECOVERY - FIRST 15 MIN: Performed by: INTERNAL MEDICINE

## 2025-06-22 PROCEDURE — 2709999900 HC NON-CHARGEABLE SUPPLY: Performed by: INTERNAL MEDICINE

## 2025-06-22 PROCEDURE — 3609017100 HC EGD: Performed by: INTERNAL MEDICINE

## 2025-06-22 PROCEDURE — 36415 COLL VENOUS BLD VENIPUNCTURE: CPT

## 2025-06-22 PROCEDURE — 2580000003 HC RX 258: Performed by: ANESTHESIOLOGY

## 2025-06-22 PROCEDURE — 6360000002 HC RX W HCPCS: Performed by: FAMILY MEDICINE

## 2025-06-22 PROCEDURE — 7100000011 HC PHASE II RECOVERY - ADDTL 15 MIN: Performed by: INTERNAL MEDICINE

## 2025-06-22 PROCEDURE — 3700000000 HC ANESTHESIA ATTENDED CARE: Performed by: INTERNAL MEDICINE

## 2025-06-22 PROCEDURE — 85014 HEMATOCRIT: CPT

## 2025-06-22 PROCEDURE — 43235 EGD DIAGNOSTIC BRUSH WASH: CPT | Performed by: INTERNAL MEDICINE

## 2025-06-22 PROCEDURE — 6360000002 HC RX W HCPCS: Performed by: ANESTHESIOLOGY

## 2025-06-22 PROCEDURE — 85025 COMPLETE CBC W/AUTO DIFF WBC: CPT

## 2025-06-22 PROCEDURE — 82962 GLUCOSE BLOOD TEST: CPT

## 2025-06-22 PROCEDURE — 6370000000 HC RX 637 (ALT 250 FOR IP): Performed by: INTERNAL MEDICINE

## 2025-06-22 PROCEDURE — 80048 BASIC METABOLIC PNL TOTAL CA: CPT

## 2025-06-22 PROCEDURE — G0378 HOSPITAL OBSERVATION PER HR: HCPCS

## 2025-06-22 PROCEDURE — 3700000001 HC ADD 15 MINUTES (ANESTHESIA): Performed by: INTERNAL MEDICINE

## 2025-06-22 PROCEDURE — 2500000003 HC RX 250 WO HCPCS: Performed by: FAMILY MEDICINE

## 2025-06-22 PROCEDURE — 3609027000 HC COLONOSCOPY: Performed by: INTERNAL MEDICINE

## 2025-06-22 PROCEDURE — 45378 DIAGNOSTIC COLONOSCOPY: CPT | Performed by: INTERNAL MEDICINE

## 2025-06-22 PROCEDURE — 6370000000 HC RX 637 (ALT 250 FOR IP)

## 2025-06-22 PROCEDURE — 2500000003 HC RX 250 WO HCPCS: Performed by: PHYSICIAN ASSISTANT

## 2025-06-22 PROCEDURE — 6370000000 HC RX 637 (ALT 250 FOR IP): Performed by: FAMILY MEDICINE

## 2025-06-22 RX ORDER — INDOMETHACIN 25 MG/1
50 CAPSULE ORAL ONCE
Status: COMPLETED | OUTPATIENT
Start: 2025-06-22 | End: 2025-06-22

## 2025-06-22 RX ORDER — SODIUM CHLORIDE, SODIUM LACTATE, POTASSIUM CHLORIDE, CALCIUM CHLORIDE 600; 310; 30; 20 MG/100ML; MG/100ML; MG/100ML; MG/100ML
INJECTION, SOLUTION INTRAVENOUS
Status: DISCONTINUED | OUTPATIENT
Start: 2025-06-22 | End: 2025-06-22 | Stop reason: SDUPTHER

## 2025-06-22 RX ORDER — SODIUM CHLORIDE, SODIUM LACTATE, POTASSIUM CHLORIDE, CALCIUM CHLORIDE 600; 310; 30; 20 MG/100ML; MG/100ML; MG/100ML; MG/100ML
INJECTION, SOLUTION INTRAVENOUS
Status: COMPLETED | OUTPATIENT
Start: 2025-06-22 | End: 2025-06-22

## 2025-06-22 RX ORDER — PROPOFOL 10 MG/ML
INJECTION, EMULSION INTRAVENOUS
Status: DISCONTINUED | OUTPATIENT
Start: 2025-06-22 | End: 2025-06-22 | Stop reason: SDUPTHER

## 2025-06-22 RX ORDER — LIDOCAINE HYDROCHLORIDE 20 MG/ML
INJECTION, SOLUTION EPIDURAL; INFILTRATION; INTRACAUDAL; PERINEURAL
Status: DISCONTINUED | OUTPATIENT
Start: 2025-06-22 | End: 2025-06-22 | Stop reason: SDUPTHER

## 2025-06-22 RX ADMIN — NADOLOL 40 MG: 40 TABLET ORAL at 11:46

## 2025-06-22 RX ADMIN — APIXABAN 5 MG: 5 TABLET, FILM COATED ORAL at 20:50

## 2025-06-22 RX ADMIN — APIXABAN 5 MG: 5 TABLET, FILM COATED ORAL at 12:49

## 2025-06-22 RX ADMIN — PROPOFOL 80 MG: 10 INJECTION, EMULSION INTRAVENOUS at 09:11

## 2025-06-22 RX ADMIN — TAMSULOSIN HYDROCHLORIDE 0.8 MG: 0.4 CAPSULE ORAL at 11:46

## 2025-06-22 RX ADMIN — LIDOCAINE HYDROCHLORIDE 60 MG: 20 INJECTION, SOLUTION EPIDURAL; INFILTRATION; INTRACAUDAL; PERINEURAL at 09:08

## 2025-06-22 RX ADMIN — SODIUM BICARBONATE 50 MEQ: 84 INJECTION INTRAVENOUS at 11:45

## 2025-06-22 RX ADMIN — PROPOFOL 20 MG: 10 INJECTION, EMULSION INTRAVENOUS at 09:14

## 2025-06-22 RX ADMIN — PROPOFOL 20 MG: 10 INJECTION, EMULSION INTRAVENOUS at 09:13

## 2025-06-22 RX ADMIN — SODIUM CHLORIDE, SODIUM LACTATE, POTASSIUM CHLORIDE, AND CALCIUM CHLORIDE: 600; 310; 30; 20 INJECTION, SOLUTION INTRAVENOUS at 09:02

## 2025-06-22 RX ADMIN — SODIUM CHLORIDE 40 MG: 9 INJECTION INTRAMUSCULAR; INTRAVENOUS; SUBCUTANEOUS at 20:50

## 2025-06-22 RX ADMIN — DILTIAZEM HYDROCHLORIDE 120 MG: 120 CAPSULE, EXTENDED RELEASE ORAL at 11:45

## 2025-06-22 RX ADMIN — SODIUM CHLORIDE, POTASSIUM CHLORIDE, SODIUM LACTATE AND CALCIUM CHLORIDE: 600; 310; 30; 20 INJECTION, SOLUTION INTRAVENOUS at 07:41

## 2025-06-22 RX ADMIN — DICLOFENAC SODIUM 2 G: 10 GEL TOPICAL at 21:26

## 2025-06-22 RX ADMIN — SODIUM CHLORIDE, PRESERVATIVE FREE 10 ML: 5 INJECTION INTRAVENOUS at 20:53

## 2025-06-22 RX ADMIN — LOSARTAN POTASSIUM 50 MG: 50 TABLET, FILM COATED ORAL at 11:46

## 2025-06-22 RX ADMIN — INSULIN LISPRO 2 UNITS: 100 INJECTION, SOLUTION INTRAVENOUS; SUBCUTANEOUS at 20:52

## 2025-06-22 RX ADMIN — SODIUM CHLORIDE 40 MG: 9 INJECTION INTRAMUSCULAR; INTRAVENOUS; SUBCUTANEOUS at 11:44

## 2025-06-22 RX ADMIN — PROPOFOL 160 MCG/KG/MIN: 10 INJECTION, EMULSION INTRAVENOUS at 09:12

## 2025-06-22 RX ADMIN — ROSUVASTATIN CALCIUM 20 MG: 10 TABLET, FILM COATED ORAL at 11:45

## 2025-06-22 ASSESSMENT — PAIN - FUNCTIONAL ASSESSMENT: PAIN_FUNCTIONAL_ASSESSMENT: 0-10

## 2025-06-22 ASSESSMENT — PAIN SCALES - WONG BAKER: WONGBAKER_NUMERICALRESPONSE: NO HURT

## 2025-06-22 NOTE — PROGRESS NOTES
TRANSFER - IN REPORT:    Verbal report received from ROBERT Salmon on [place]  being received from PACU for routine progression of patient care      Report consisted of patient's Situation, Background, Assessment and   Recommendations(SBAR).     Information from the following report(s) Nurse Handoff Report was reviewed with the receiving nurse.    Opportunity for questions and clarification was provided.      Assessment completed upon patient's arrival to unit and care assumed.

## 2025-06-22 NOTE — PERIOP NOTE
TRANSFER - OUT REPORT:    Verbal report given to ROBERT Park on [place]  being transferred to [facility] for routine progression of patient care       Report consisted of patient's Situation, Background, Assessment and   Recommendations(SBAR).     Information from the following report(s) Nurse Handoff Report, Adult Overview, Surgery Report, and Cardiac Rhythm controlled afib was reviewed with the receiving nurse.           Lines:   Peripheral IV 06/20/25 Right Forearm (Active)   Site Assessment Clean, dry & intact 06/22/25 0938   Line Status Infusing 06/22/25 0938   Line Care Connections checked and tightened 06/22/25 0938   Phlebitis Assessment No symptoms 06/22/25 0938   Infiltration Assessment 0 06/22/25 0938   Alcohol Cap Used No 06/22/25 0736   Dressing Status Clean, dry & intact 06/22/25 0938   Dressing Type Transparent 06/22/25 0938        Opportunity for questions and clarification was provided.

## 2025-06-22 NOTE — PROGRESS NOTES
TRANSFER - OUT REPORT:    Verbal report given to Tahira JONES on [place]  being transferred to GI Lab for ordered procedure       Report consisted of patient's Situation, Background, Assessment and   Recommendations(SBAR).     Information from the following report(s) Nurse Handoff Report was reviewed with the receiving nurse.           Lines:   Peripheral IV 06/20/25 Right Forearm (Active)   Site Assessment Clean, dry & intact 06/21/25 1943   Line Status Normal saline locked 06/21/25 1943   Line Care Connections checked and tightened 06/21/25 1943   Phlebitis Assessment No symptoms 06/21/25 1943   Infiltration Assessment 0 06/21/25 1943   Alcohol Cap Used Yes 06/21/25 1943   Dressing Status Clean, dry & intact 06/21/25 1943   Dressing Type Transparent 06/21/25 1943        Opportunity for questions and clarification was provided.

## 2025-06-22 NOTE — OP NOTE
Procedure: EGD    Indication: Suspected GI bleed; melena    Date of Procedure: 6/22/2025    Patient profile: Refer to patient note in chart for documentation of history and physical    Providers: Zenobia Chahal MD    Referring MD: No ref. provider found    PCP: Kiana Meléndez APRN    Medicines: Monitored anesthesia care    Complication: No immediate complications.     Estimated blood loss: Minimal    Procedure: After the risks including, but not limited to medication reaction, infection, bleeding, perforation, missed lesions, benefits and alternatives of the procedure were discussed with the patient and all questions were answered, informed consent was obtained. The gastroscope was passed under direct vision throughout the procedure, the patient's blood pressure pulse and oxygen saturation were monitored continuously. The scope was introduced through the mouth and advanced to the 2nd portion of the duodenum. The endoscopy was performed without difficulty. The patient tolerated the procedure well.       Findings:   --The adult gastroscope was introduced from the mouth and advanced through the esophagus to the GE junction.   --The esophagus was normal. A non-obstructive Schatzki ring was noted.  --The scope was advanced to the stomach which was  normal in forward and retroflexed views. A hiatal hernia was noted. Minmal antral gastropathy was noted.  --Duodenum was normal to 2nd portion.    The scope was pulled back, and the procedure was subsequently ended.    Impression:  --Schatzki ring  --minimal gastritis    Recommendations:  -- Proceed to Colonoscopy    Zenobia Chahal MD  Gastroenterology and Interventional Endoscopy       Acitretin Counseling:  I discussed with the patient the risks of acitretin including but not limited to hair loss, dry lips/skin/eyes, liver damage, hyperlipidemia, depression/suicidal ideation, photosensitivity.  Serious rare side effects can include but are not limited to pancreatitis, pseudotumor cerebri, bony changes, clot formation/stroke/heart attack.  Patient understands that alcohol is contraindicated since it can result in liver toxicity and significantly prolong the elimination of the drug by many years.

## 2025-06-22 NOTE — OP NOTE
Procedure: Colonoscopy    Indication: GI bleeding    Date of Procedure: 6/22/2025    Patient profile: Refer to patient note in chart for documentation of history and physical    Providers: Zenobia Chahal MD    Referring MD: No ref. provider found    PCP: Kiana Meléndez APRN    Medicines: Monitored anesthesia care    Complication: No immediate complications.     Estimated blood loss: Minimal    Procedure: After the risks including, but not limited to medication reaction, infection, bleeding, perforation, missed lesions, benefits and alternatives of the procedure were discussed with the patient and all questions were answered, informed consent was obtained. The pediatric colonoscope was passed under direct vision throughout the procedure, the patient's blood pressure pulse and oxygen saturation were monitored continuously. The scope was introduced through the anal canal and advanced to the cecum / Surgical anastomosis. The endoscopy was performed without difficulty.The patient tolerated the procedure well.     Findings:     The pediatric colonoscope was introduced from the anal canal and advanced to the cecum. The quality of the bowel preparation was evaluated using the BBPS ([place] Bowel Preparation Scale) with scores of right colon = 2, transverse colon =3, and left colon = 2. The total BBPS score was 7. The rest of the entire examined colon was normal.     --There was evidence of a possible end to end surgical anastomosis in the cecum.  This was normal.  No evidence of large diverticular disease.  -- On retroflexion large hemorrhoids were noted, these were all internal hemorrhoids.  There were 4 total columns noted.  These appear to be grade 3.  I decided not to band this at this time given there was no obvious evidence of bleeding.      The scope was pulled back, and the procedure was subsequently ended.    Impression:  --Suspect patient's bleeding source is likely internal hemorrhoidal

## 2025-06-22 NOTE — PROGRESS NOTES
Hospitalist Progress Note   Admit Date:  2025  7:19 PM   Name:  Can Land   Age:  79 y.o.  Sex:  male  :  1945   MRN:  [id number]   Room:  [facility]/    Presenting/Chief Complaint: Rectal Bleeding     Reason(s) for Admission: Melena [K92.1]  Rectal bleeding [K62.5]  Chronic anticoagulation [Z79.01]  Paroxysmal A-fib (HCC) [I48.0]     Hospital Course:   Can Land is a 79 y.o. male with medical history of atrial fibrillation on apixaban, hypertension, HLD, DM2, CKD 3a, anxiety, BPH c/o melenotic stooling x 2 days.  Patient has associated dizziness and weakness. He denied hx of GIB, gastric ulcers; further denied hematemesis, coffee-grind emesis, hematochezia.    In ER, VS with T97.2, HR 77, RR 17, /93, 99% RA.  Labwork remarkable for CO2 19, creatinine 1.54.  H/h 13.0 / 40.4 respectively on CBC.  Hospitalist consulted for inpatient admission.     Pt taken to EGD / colonoscopy by Dr. Chahal and found with internal hemorrhoids without active bleeding.  Per GI okay to resume apixaban    Subjective & 24hr Events:   \"I'm not dizzy getting up anymore; can I go home?\" Pleasant 79y.o. WM sitting up on bedside commode    Denies byron blood per rectum / bloody stooling.  Further denies dizziness on ambulation this AM.      Assessment & Plan:     Principal Problem:    Melena  - okay from GI standpoint to resume OAC this evening  - EGD noted Schatzki ring, minimal gastritis; C-scope revealed non-bleeding hemorrhoids   - hgb ranges stable 9.2 -> 8.2 -> 7.9 -> 8.3 -> 8.7  - transfuse if hgb < 7.0    Active Problems:    Atrial fibrillation / secondary hypercoagulable state  - apixaban to resue this evening   - rate ~controlled       Hypertension  - BP ranges improved  - cont losartan as dosed      Anion gap metabolic acidosis  - worsened with bowel prep  - repeat sodium bicarb IV now  - f/u AM BMP      DM2  - cont holding metformin / glipizide  - sliding scale coverage as needed  - acceptable ranges

## 2025-06-22 NOTE — PROGRESS NOTES
Pt was unable to complete all of bowel prep. Pt had numerous stools throughout the night. Pt's most recent BM was watery with blood still present. Pt has had nothing to eat or drink except for bowel prep since midnight. Rounds performed throughout shift. Pt denies needs at this time. Bed in low position, locked and call light/personal items within reach.

## 2025-06-23 ENCOUNTER — ANESTHESIA EVENT (OUTPATIENT)
Dept: ENDOSCOPY | Age: 80
End: 2025-06-23
Payer: MEDICARE

## 2025-06-23 ENCOUNTER — ANESTHESIA (OUTPATIENT)
Dept: ENDOSCOPY | Age: 80
End: 2025-06-23
Payer: MEDICARE

## 2025-06-23 PROBLEM — K62.5 RECTAL BLEEDING: Status: ACTIVE | Noted: 2025-06-23

## 2025-06-23 LAB
ANION GAP SERPL CALC-SCNC: 11 MMOL/L (ref 7–16)
BASOPHILS # BLD: 0.02 K/UL (ref 0–0.2)
BASOPHILS NFR BLD: 0.4 % (ref 0–2)
BUN SERPL-MCNC: 35 MG/DL (ref 8–23)
CALCIUM SERPL-MCNC: 8.7 MG/DL (ref 8.8–10.2)
CHLORIDE SERPL-SCNC: 110 MMOL/L (ref 98–107)
CO2 SERPL-SCNC: 22 MMOL/L (ref 20–29)
CREAT SERPL-MCNC: 1.72 MG/DL (ref 0.8–1.3)
DIFFERENTIAL METHOD BLD: ABNORMAL
EOSINOPHIL # BLD: 0.04 K/UL (ref 0–0.8)
EOSINOPHIL NFR BLD: 0.7 % (ref 0.5–7.8)
ERYTHROCYTE [DISTWIDTH] IN BLOOD BY AUTOMATED COUNT: 16.6 % (ref 11.9–14.6)
GLUCOSE BLD STRIP.AUTO-MCNC: 191 MG/DL (ref 65–100)
GLUCOSE BLD STRIP.AUTO-MCNC: 220 MG/DL (ref 65–100)
GLUCOSE BLD STRIP.AUTO-MCNC: 226 MG/DL (ref 65–100)
GLUCOSE BLD STRIP.AUTO-MCNC: 268 MG/DL (ref 65–100)
GLUCOSE SERPL-MCNC: 206 MG/DL (ref 70–99)
HCT VFR BLD AUTO: 20 % (ref 41.1–50.3)
HCT VFR BLD AUTO: 21.6 % (ref 41.1–50.3)
HCT VFR BLD AUTO: 25.5 % (ref 41.1–50.3)
HCT VFR BLD AUTO: 27.3 % (ref 41.1–50.3)
HCT VFR BLD AUTO: 28.8 % (ref 41.1–50.3)
HGB BLD-MCNC: 6.6 G/DL (ref 13.6–17.2)
HGB BLD-MCNC: 7.1 G/DL (ref 13.6–17.2)
HGB BLD-MCNC: 8.3 G/DL (ref 13.6–17.2)
HGB BLD-MCNC: 8.9 G/DL (ref 13.6–17.2)
HGB BLD-MCNC: 9.1 G/DL (ref 13.6–17.2)
HISTORY CHECK: NORMAL
IMM GRANULOCYTES # BLD AUTO: 0.04 K/UL (ref 0–0.5)
IMM GRANULOCYTES NFR BLD AUTO: 0.7 % (ref 0–5)
LYMPHOCYTES # BLD: 1.3 K/UL (ref 0.5–4.6)
LYMPHOCYTES NFR BLD: 23.9 % (ref 13–44)
MCH RBC QN AUTO: 27 PG (ref 26.1–32.9)
MCHC RBC AUTO-ENTMCNC: 33 G/DL (ref 31.4–35)
MCV RBC AUTO: 82 FL (ref 82–102)
MONOCYTES # BLD: 0.61 K/UL (ref 0.1–1.3)
MONOCYTES NFR BLD: 11.2 % (ref 4–12)
NEUTS SEG # BLD: 3.42 K/UL (ref 1.7–8.2)
NEUTS SEG NFR BLD: 63.1 % (ref 43–78)
NRBC # BLD: 0.02 K/UL (ref 0–0.2)
PLATELET # BLD AUTO: 120 K/UL (ref 150–450)
PMV BLD AUTO: 11.2 FL (ref 9.4–12.3)
POTASSIUM SERPL-SCNC: 3.6 MMOL/L (ref 3.5–5.1)
RBC # BLD AUTO: 2.44 M/UL (ref 4.23–5.6)
SERVICE CMNT-IMP: ABNORMAL
SODIUM SERPL-SCNC: 142 MMOL/L (ref 136–145)
WBC # BLD AUTO: 5.4 K/UL (ref 4.3–11.1)

## 2025-06-23 PROCEDURE — 3609008400 HC SIGMOIDOSCOPY DIAGNOSTIC: Performed by: INTERNAL MEDICINE

## 2025-06-23 PROCEDURE — 7100000010 HC PHASE II RECOVERY - FIRST 15 MIN: Performed by: INTERNAL MEDICINE

## 2025-06-23 PROCEDURE — 2500000003 HC RX 250 WO HCPCS: Performed by: FAMILY MEDICINE

## 2025-06-23 PROCEDURE — P9016 RBC LEUKOCYTES REDUCED: HCPCS

## 2025-06-23 PROCEDURE — 6370000000 HC RX 637 (ALT 250 FOR IP): Performed by: FAMILY MEDICINE

## 2025-06-23 PROCEDURE — 6370000000 HC RX 637 (ALT 250 FOR IP): Performed by: PHYSICIAN ASSISTANT

## 2025-06-23 PROCEDURE — 3700000000 HC ANESTHESIA ATTENDED CARE: Performed by: INTERNAL MEDICINE

## 2025-06-23 PROCEDURE — 82962 GLUCOSE BLOOD TEST: CPT

## 2025-06-23 PROCEDURE — 3700000001 HC ADD 15 MINUTES (ANESTHESIA): Performed by: INTERNAL MEDICINE

## 2025-06-23 PROCEDURE — 2500000003 HC RX 250 WO HCPCS

## 2025-06-23 PROCEDURE — 7100000011 HC PHASE II RECOVERY - ADDTL 15 MIN: Performed by: INTERNAL MEDICINE

## 2025-06-23 PROCEDURE — 80048 BASIC METABOLIC PNL TOTAL CA: CPT

## 2025-06-23 PROCEDURE — 36430 TRANSFUSION BLD/BLD COMPNT: CPT

## 2025-06-23 PROCEDURE — G0378 HOSPITAL OBSERVATION PER HR: HCPCS

## 2025-06-23 PROCEDURE — 2720000010 HC SURG SUPPLY STERILE: Performed by: INTERNAL MEDICINE

## 2025-06-23 PROCEDURE — 2709999900 HC NON-CHARGEABLE SUPPLY: Performed by: INTERNAL MEDICINE

## 2025-06-23 PROCEDURE — 85014 HEMATOCRIT: CPT

## 2025-06-23 PROCEDURE — 6360000002 HC RX W HCPCS: Performed by: FAMILY MEDICINE

## 2025-06-23 PROCEDURE — 2580000003 HC RX 258

## 2025-06-23 PROCEDURE — 96376 TX/PRO/DX INJ SAME DRUG ADON: CPT

## 2025-06-23 PROCEDURE — 6370000000 HC RX 637 (ALT 250 FOR IP): Performed by: NURSE PRACTITIONER

## 2025-06-23 PROCEDURE — 36415 COLL VENOUS BLD VENIPUNCTURE: CPT

## 2025-06-23 PROCEDURE — 6360000002 HC RX W HCPCS

## 2025-06-23 PROCEDURE — 85025 COMPLETE CBC W/AUTO DIFF WBC: CPT

## 2025-06-23 PROCEDURE — 85018 HEMOGLOBIN: CPT

## 2025-06-23 RX ORDER — EPHEDRINE SULFATE 5 MG/ML
INJECTION INTRAVENOUS
Status: DISCONTINUED | OUTPATIENT
Start: 2025-06-23 | End: 2025-06-23 | Stop reason: SDUPTHER

## 2025-06-23 RX ORDER — POLYETHYLENE GLYCOL 3350 17 G/17G
17 POWDER, FOR SOLUTION ORAL DAILY
Status: DISCONTINUED | OUTPATIENT
Start: 2025-06-23 | End: 2025-06-24 | Stop reason: HOSPADM

## 2025-06-23 RX ORDER — SODIUM CHLORIDE 9 MG/ML
INJECTION, SOLUTION INTRAVENOUS PRN
Status: DISCONTINUED | OUTPATIENT
Start: 2025-06-23 | End: 2025-06-24 | Stop reason: HOSPADM

## 2025-06-23 RX ORDER — PROPOFOL 10 MG/ML
INJECTION, EMULSION INTRAVENOUS
Status: DISCONTINUED | OUTPATIENT
Start: 2025-06-23 | End: 2025-06-23 | Stop reason: SDUPTHER

## 2025-06-23 RX ORDER — LIDOCAINE HYDROCHLORIDE 20 MG/ML
INJECTION, SOLUTION EPIDURAL; INFILTRATION; INTRACAUDAL; PERINEURAL
Status: DISCONTINUED | OUTPATIENT
Start: 2025-06-23 | End: 2025-06-23 | Stop reason: SDUPTHER

## 2025-06-23 RX ORDER — PANTOPRAZOLE SODIUM 40 MG/1
40 TABLET, DELAYED RELEASE ORAL ONCE
Status: COMPLETED | OUTPATIENT
Start: 2025-06-23 | End: 2025-06-23

## 2025-06-23 RX ORDER — DOCUSATE SODIUM 100 MG/1
100 CAPSULE, LIQUID FILLED ORAL 2 TIMES DAILY
Status: DISCONTINUED | OUTPATIENT
Start: 2025-06-23 | End: 2025-06-24 | Stop reason: HOSPADM

## 2025-06-23 RX ORDER — SODIUM CHLORIDE, SODIUM LACTATE, POTASSIUM CHLORIDE, CALCIUM CHLORIDE 600; 310; 30; 20 MG/100ML; MG/100ML; MG/100ML; MG/100ML
INJECTION, SOLUTION INTRAVENOUS
Status: DISCONTINUED | OUTPATIENT
Start: 2025-06-23 | End: 2025-06-23 | Stop reason: SDUPTHER

## 2025-06-23 RX ORDER — PHENYLEPHRINE HYDROCHLORIDE 10 MG/ML
INJECTION INTRAVENOUS
Status: DISCONTINUED | OUTPATIENT
Start: 2025-06-23 | End: 2025-06-23 | Stop reason: SDUPTHER

## 2025-06-23 RX ADMIN — DILTIAZEM HYDROCHLORIDE 120 MG: 120 CAPSULE, EXTENDED RELEASE ORAL at 08:57

## 2025-06-23 RX ADMIN — PHENYLEPHRINE HYDROCHLORIDE 200 MCG: 10 INJECTION INTRAVENOUS at 10:46

## 2025-06-23 RX ADMIN — TAMSULOSIN HYDROCHLORIDE 0.8 MG: 0.4 CAPSULE ORAL at 08:56

## 2025-06-23 RX ADMIN — INSULIN LISPRO 1 UNITS: 100 INJECTION, SOLUTION INTRAVENOUS; SUBCUTANEOUS at 08:57

## 2025-06-23 RX ADMIN — POLYETHYLENE GLYCOL 3350 17 G: 17 POWDER, FOR SOLUTION ORAL at 15:44

## 2025-06-23 RX ADMIN — PROPOFOL 50 MG: 10 INJECTION, EMULSION INTRAVENOUS at 10:24

## 2025-06-23 RX ADMIN — NADOLOL 40 MG: 40 TABLET ORAL at 08:57

## 2025-06-23 RX ADMIN — SODIUM CHLORIDE, PRESERVATIVE FREE 10 ML: 5 INJECTION INTRAVENOUS at 23:04

## 2025-06-23 RX ADMIN — PROPOFOL 140 MCG/KG/MIN: 10 INJECTION, EMULSION INTRAVENOUS at 10:25

## 2025-06-23 RX ADMIN — LIDOCAINE HYDROCHLORIDE 50 MG: 20 INJECTION, SOLUTION EPIDURAL; INFILTRATION; INTRACAUDAL; PERINEURAL at 10:24

## 2025-06-23 RX ADMIN — PHENYLEPHRINE HYDROCHLORIDE 150 MCG: 10 INJECTION INTRAVENOUS at 10:30

## 2025-06-23 RX ADMIN — PHENYLEPHRINE HYDROCHLORIDE 200 MCG: 10 INJECTION INTRAVENOUS at 10:44

## 2025-06-23 RX ADMIN — EPHEDRINE SULFATE 10 MG: 5 INJECTION INTRAVENOUS at 10:49

## 2025-06-23 RX ADMIN — PHENYLEPHRINE HYDROCHLORIDE 200 MCG: 10 INJECTION INTRAVENOUS at 10:37

## 2025-06-23 RX ADMIN — PANTOPRAZOLE SODIUM 40 MG: 40 TABLET, DELAYED RELEASE ORAL at 22:51

## 2025-06-23 RX ADMIN — EPHEDRINE SULFATE 10 MG: 5 INJECTION INTRAVENOUS at 10:37

## 2025-06-23 RX ADMIN — PHENYLEPHRINE HYDROCHLORIDE 150 MCG: 10 INJECTION INTRAVENOUS at 10:35

## 2025-06-23 RX ADMIN — SODIUM CHLORIDE, SODIUM LACTATE, POTASSIUM CHLORIDE, AND CALCIUM CHLORIDE: 600; 310; 30; 20 INJECTION, SOLUTION INTRAVENOUS at 10:09

## 2025-06-23 RX ADMIN — EPHEDRINE SULFATE 5 MG: 5 INJECTION INTRAVENOUS at 10:44

## 2025-06-23 RX ADMIN — INSULIN LISPRO 1 UNITS: 100 INJECTION, SOLUTION INTRAVENOUS; SUBCUTANEOUS at 17:32

## 2025-06-23 RX ADMIN — SODIUM CHLORIDE 40 MG: 9 INJECTION INTRAMUSCULAR; INTRAVENOUS; SUBCUTANEOUS at 08:56

## 2025-06-23 RX ADMIN — INSULIN LISPRO 2 UNITS: 100 INJECTION, SOLUTION INTRAVENOUS; SUBCUTANEOUS at 22:52

## 2025-06-23 RX ADMIN — EPHEDRINE SULFATE 10 MG: 5 INJECTION INTRAVENOUS at 10:35

## 2025-06-23 RX ADMIN — LOSARTAN POTASSIUM 50 MG: 50 TABLET, FILM COATED ORAL at 08:57

## 2025-06-23 RX ADMIN — INSULIN LISPRO 1 UNITS: 100 INJECTION, SOLUTION INTRAVENOUS; SUBCUTANEOUS at 12:32

## 2025-06-23 RX ADMIN — SODIUM CHLORIDE, PRESERVATIVE FREE 10 ML: 5 INJECTION INTRAVENOUS at 09:02

## 2025-06-23 RX ADMIN — PHENYLEPHRINE HYDROCHLORIDE 100 MCG: 10 INJECTION INTRAVENOUS at 10:49

## 2025-06-23 RX ADMIN — ROSUVASTATIN CALCIUM 20 MG: 10 TABLET, FILM COATED ORAL at 08:56

## 2025-06-23 RX ADMIN — PHENYLEPHRINE HYDROCHLORIDE 200 MCG: 10 INJECTION INTRAVENOUS at 10:32

## 2025-06-23 ASSESSMENT — PAIN - FUNCTIONAL ASSESSMENT: PAIN_FUNCTIONAL_ASSESSMENT: NONE - DENIES PAIN

## 2025-06-23 ASSESSMENT — PAIN SCALES - GENERAL
PAINLEVEL_OUTOF10: 0

## 2025-06-23 NOTE — PROGRESS NOTES
Hospitalist Progress Note   Admit Date:  2025  7:19 PM   Name:  Can Land   Age:  79 y.o.  Sex:  male  :  1945   MRN:  [id number]   Room:  [facility]    Presenting/Chief Complaint: Rectal Bleeding     Reason(s) for Admission: Melena [K92.1]  Rectal bleeding [K62.5]  Chronic anticoagulation [Z79.01]  Paroxysmal A-fib (HCC) [I48.0]     Hospital Course:   Can Land is a 79 y.o. male with medical history of atrial fibrillation on apixaban, hypertension, HLD, DM2, CKD 3a, anxiety, BPH c/o melenotic stooling x 2 days.  Patient has associated dizziness and weakness. He denied hx of GIB, gastric ulcers; further denied hematemesis, coffee-grind emesis, hematochezia.    In ER, VS with T97.2, HR 77, RR 17, /93, 99% RA.  Labwork remarkable for CO2 19, creatinine 1.54.  H/h 13.0 / 40.4 respectively on CBC.  Hospitalist consulted for inpatient admission.     Pt taken to EGD / colonoscopy by Dr. Chahal and found with internal hemorrhoids without active bleeding.  Per GI okay to resume apixaban evening 2025.  Patient reported to BM thereafter with clots / tarry stooling; hgb dropped to 6.6 and transfusion ordered 2025.  Apixaban held, GI took patient for flex sigmoidoscopy with hemorrhoidal banding.    Subjective & 24hr Events:   \"I am dizzy sitting up now.\"  Pleasant pale 79y.o. WM sitting up on bedside commode    Admits to recurrent tarry stooling and dizziness; denies CP, dyspnea, abd pains, HA, visual deficits    Assessment & Plan:     Principal Problem:    Melena  - hgb dropped to 6.6 this AM; apixaban held again (one dose given 2025 evening), GI took patient for flex sigmoidoscopy today with large hemorrhoidal banding  - 1u PRBC administered with repeat hgb 6.6 -> 9.1  - GI recommending colace bid, high fiber diet and daily miralax  - defer to GI when apixaban can be restarted     Active Problems:    Atrial fibrillation / secondary hypercoagulable state  - apixaban held again due to

## 2025-06-23 NOTE — ANESTHESIA PRE PROCEDURE
Department of Anesthesiology  Preprocedure Note       Name:  Can Land   Age:  79 y.o.  :  1945                                          MRN:  [id number]         Date:  2025      Surgeon: Surgeon(s):  Oleksandr Hull MD    Procedure: Procedure(s):  SIGMOIDOSCOPY DIAGNOSTIC FLEXIBLE BANDING    Medications prior to admission:   Prior to Admission medications    Medication Sig Start Date End Date Taking? Authorizing Provider   apixaban (ELIQUIS) 5 MG TABS tablet Take 1 tablet by mouth 2 times daily Indications: Atrial Fibrillation   Yes Josh Duncan MD   potassium chloride (MICRO-K) 10 MEQ extended release capsule Take 1 capsule by mouth 2 times daily   Yes Josh Duncan MD   losartan (COZAAR) 50 MG tablet Take 1 tablet by mouth daily   Yes Josh Duncan MD   glipiZIDE (GLUCOTROL XL) 5 MG extended release tablet Take 1 tablet by mouth daily   Yes Josh Duncan MD   cloNIDine (CATAPRES) 0.1 MG tablet Take 1 tablet by mouth 2 times daily   Yes Josh Duncan MD   clonazePAM (KLONOPIN) 1 MG tablet Take 1 tablet by mouth 2 times daily as needed for Anxiety. Max Daily Amount: 2 mg   Yes Josh Duncan MD   metFORMIN (GLUCOPHAGE-XR) 500 MG extended release tablet Take 1 tablet by mouth daily (with breakfast)   Yes Josh Duncan MD   nadolol (CORGARD) 20 MG tablet Take 1 tablet by mouth in the morning and at bedtime   Yes Josh Duncan MD   tamsulosin (FLOMAX) 0.4 MG capsule Take 2 capsules by mouth daily   Yes Josh Duncan MD   probenecid (BENEMID) 500 MG tablet Take 1 tablet by mouth Daily   Yes Josh Duncan MD   dapagliflozin (FARXIGA) 10 MG tablet Take 1 tablet by mouth every morning   Yes Josh Duncan MD   dilTIAZem (CARDIZEM 12 HR) 120 MG extended release capsule Take 1 capsule by mouth Daily   Yes Josh Duncan MD   vitamin D (ERGOCALCIFEROL) 1.25 MG (07055 UT) CAPS capsule Take 1 capsule by mouth once

## 2025-06-23 NOTE — CONSULTS
Consult completed; see procedure notes from yesterday. Progress note to follow.     JOSSELINE Hook

## 2025-06-23 NOTE — PROGRESS NOTES
Pt is in bed without complaints. Hourly rounds completed and all needs met. Pt's hgb was 6.6 this morning, 1 unit PRBC given, last hgb was 9.1. Pt went down for flexsig this morning, tap water enema given prior to procedure. Bed is low, locked, and call light is in reach. Pt encouraged to call for assistance.

## 2025-06-23 NOTE — PROGRESS NOTES
TRANSFER - OUT REPORT:    Verbal report given to ROBERT Martinez on Can Land  being transferred to [facility] for ordered procedure       Report consisted of patient's Situation, Background, Assessment and   Recommendations(SBAR).     Information from the following report(s) Nurse Handoff Report was reviewed with the receiving nurse.           Lines:   Peripheral IV 06/22/25 Left;Anterior Forearm (Active)   Site Assessment Clean, dry & intact 06/23/25 0716   Line Status Capped;Flushed 06/23/25 0716   Line Care Connections checked and tightened 06/23/25 0716   Phlebitis Assessment No symptoms 06/23/25 0716   Infiltration Assessment 0 06/23/25 0716   Alcohol Cap Used Yes 06/23/25 0716   Dressing Status Clean, dry & intact 06/23/25 0716   Dressing Type Transparent 06/23/25 0716        Opportunity for questions and clarification was provided.      Patient transported with:  Tech

## 2025-06-23 NOTE — PROGRESS NOTES
TRANSFER - OUT REPORT:    Verbal report given to ROBERT Barnes on Can Land  being transferred to [facility] for routine progression of patient care       Report consisted of patient's Situation, Background, Assessment and   Recommendations(SBAR).     Information from the following report(s) Nurse Handoff Report was reviewed with the receiving nurse.           Lines:   Peripheral IV 06/22/25 Left;Anterior Forearm (Active)   Site Assessment Clean, dry & intact 06/23/25 0716   Line Status Capped;Flushed 06/23/25 0716   Line Care Connections checked and tightened 06/23/25 0716   Phlebitis Assessment No symptoms 06/23/25 0716   Infiltration Assessment 0 06/23/25 0716   Alcohol Cap Used Yes 06/23/25 0716   Dressing Status Clean, dry & intact 06/23/25 0716   Dressing Type Transparent 06/23/25 0716        Opportunity for questions and clarification was provided.      Patient will be transported with:  Tech

## 2025-06-23 NOTE — OP NOTE
Procedure: Flex Sig with banding    Indication: Rectal hemorrhoids    Date of Procedure: 6/23/2025    Patient profile: Refer to patient note in chart for documentation of history and physical    Providers: Zenobia Chahal MD    Referring MD: Dr. Hull    PCP: Nelson Hilario DO     Medicines: Monitored anesthesia care,     Complication: No immediate complications.     Estimated blood loss: Minimal    Procedure: After the risks including, but not limited to medication reaction, infection, bleeding, perforation, missed lesions, benefits and alternatives of the procedure were discussed with the patient and all questions were answered, informed consent was obtained. The gastroscope   was passed under direct vision throughout the procedure, the patient's blood pressure pulse and oxygen saturation were monitored continuously. The scope was introduced through the anal canal and advanced to the sigmoid colon. The endoscopy was performed without difficulty.The patient tolerated the procedure well.   Asked to assist by Dr. Hull for banding. (Please see other report from him)    Findings:     The adult gastroscope was introduced from the anal canal and advanced to the sigmoid colon. The quality of the bowel preparation was  good.     Findings consistent with large rectal internal hemorrhoids. 3 columns were noted. A banding unit attached to the stomach, and a total of 3 bands placed. One was placed in retroflexed view, another 2 placed in antegrade view..    The scope was pulled back, and the procedure was subsequently ended.    Impression:  --Internal Hemorrhoids noted. Banding performed    Recommendations:  --further recs from GI Consult service    Zenobia Chahal MD  Gastroenterology and Interventional Endoscopy

## 2025-06-23 NOTE — PROGRESS NOTES
TRANSFER - IN REPORT:    Verbal report received from Cameron JONES on Can Land  being received from 634 for ordered procedure      Report consisted of patient's Situation, Background, Assessment and   Recommendations(SBAR).     Information from the following report(s) Nurse Handoff Report was reviewed with the receiving nurse.    Opportunity for questions and clarification was provided.      Assessment completed upon patient's arrival to unit and care assumed.  Aliyah

## 2025-06-23 NOTE — ANESTHESIA POSTPROCEDURE EVALUATION
Department of Anesthesiology  Postprocedure Note    Patient: Can Land  MRN: [id number]  YOB: 1945  Date of evaluation: 6/23/2025    Procedure Summary       Date: 06/23/25 Room / Location: [facility] ENDO 02 / [facility] ENDOSCOPY    Anesthesia Start: 1018 Anesthesia Stop: 1055    Procedure: SIGMOIDOSCOPY DIAGNOSTIC FLEXIBLE BANDING Diagnosis:       Rectal bleeding      (Rectal bleeding [K62.5])    Surgeons: Oleksandr Hull MD Responsible Provider: Burke Caicedo DO    Anesthesia Type: TIVA ASA Status: 3 - Emergent            Anesthesia Type: No value filed.    Jeanie Phase I: Jeanie Score: 10    Jeanie Phase II: Jeanie Score: 10    Anesthesia Post Evaluation    Patient location during evaluation: PACU  Level of consciousness: awake and alert  Airway patency: patent  Nausea & Vomiting: no nausea  Cardiovascular status: hemodynamically stable  Respiratory status: acceptable  Hydration status: euvolemic  Comments: Blood pressure 122/77, pulse 72, temperature 97.7 °F (36.5 °C), temperature source Oral, resp. rate 16, height 1.727 m (5' 8\"), weight 68 kg (150 lb), SpO2 99%.  Pain management: satisfactory to patient    No notable events documented.

## 2025-06-24 VITALS
RESPIRATION RATE: 17 BRPM | DIASTOLIC BLOOD PRESSURE: 86 MMHG | SYSTOLIC BLOOD PRESSURE: 133 MMHG | BODY MASS INDEX: 22.73 KG/M2 | OXYGEN SATURATION: 96 % | TEMPERATURE: 98.2 F | HEART RATE: 85 BPM | HEIGHT: 68 IN | WEIGHT: 150 LBS

## 2025-06-24 LAB
ABO + RH BLD: NORMAL
ANION GAP SERPL CALC-SCNC: 10 MMOL/L (ref 7–16)
BASOPHILS # BLD: 0.02 K/UL (ref 0–0.2)
BASOPHILS NFR BLD: 0.4 % (ref 0–2)
BLD PROD TYP BPU: NORMAL
BLOOD BANK BLOOD PRODUCT EXPIRATION DATE: NORMAL
BLOOD BANK DISPENSE STATUS: NORMAL
BLOOD BANK ISBT PRODUCT BLOOD TYPE: 6200
BLOOD BANK PRODUCT CODE: NORMAL
BLOOD BANK UNIT TYPE AND RH: NORMAL
BLOOD GROUP ANTIBODIES SERPL: NORMAL
BPU ID: NORMAL
BUN SERPL-MCNC: 26 MG/DL (ref 8–23)
CALCIUM SERPL-MCNC: 8.8 MG/DL (ref 8.8–10.2)
CHLORIDE SERPL-SCNC: 108 MMOL/L (ref 98–107)
CO2 SERPL-SCNC: 22 MMOL/L (ref 20–29)
CREAT SERPL-MCNC: 1.64 MG/DL (ref 0.8–1.3)
CROSSMATCH RESULT: NORMAL
DIFFERENTIAL METHOD BLD: ABNORMAL
EOSINOPHIL # BLD: 0.13 K/UL (ref 0–0.8)
EOSINOPHIL NFR BLD: 2.3 % (ref 0.5–7.8)
ERYTHROCYTE [DISTWIDTH] IN BLOOD BY AUTOMATED COUNT: 16.2 % (ref 11.9–14.6)
GLUCOSE BLD STRIP.AUTO-MCNC: 184 MG/DL (ref 65–100)
GLUCOSE BLD STRIP.AUTO-MCNC: 204 MG/DL (ref 65–100)
GLUCOSE BLD STRIP.AUTO-MCNC: 260 MG/DL (ref 65–100)
GLUCOSE SERPL-MCNC: 184 MG/DL (ref 70–99)
HCT VFR BLD AUTO: 24.8 % (ref 41.1–50.3)
HCT VFR BLD AUTO: 24.9 % (ref 41.1–50.3)
HCT VFR BLD AUTO: 27.5 % (ref 41.1–50.3)
HGB BLD-MCNC: 7.9 G/DL (ref 13.6–17.2)
HGB BLD-MCNC: 8 G/DL (ref 13.6–17.2)
HGB BLD-MCNC: 8.8 G/DL (ref 13.6–17.2)
IMM GRANULOCYTES # BLD AUTO: 0.04 K/UL (ref 0–0.5)
IMM GRANULOCYTES NFR BLD AUTO: 0.7 % (ref 0–5)
LYMPHOCYTES # BLD: 1.27 K/UL (ref 0.5–4.6)
LYMPHOCYTES NFR BLD: 22.5 % (ref 13–44)
MCH RBC QN AUTO: 27.1 PG (ref 26.1–32.9)
MCHC RBC AUTO-ENTMCNC: 31.7 G/DL (ref 31.4–35)
MCV RBC AUTO: 85.6 FL (ref 82–102)
MONOCYTES # BLD: 0.67 K/UL (ref 0.1–1.3)
MONOCYTES NFR BLD: 11.9 % (ref 4–12)
NEUTS SEG # BLD: 3.52 K/UL (ref 1.7–8.2)
NEUTS SEG NFR BLD: 62.2 % (ref 43–78)
NRBC # BLD: 0.03 K/UL (ref 0–0.2)
PLATELET # BLD AUTO: 135 K/UL (ref 150–450)
PMV BLD AUTO: 10.7 FL (ref 9.4–12.3)
POTASSIUM SERPL-SCNC: 3.6 MMOL/L (ref 3.5–5.1)
RBC # BLD AUTO: 2.91 M/UL (ref 4.23–5.6)
SERVICE CMNT-IMP: ABNORMAL
SODIUM SERPL-SCNC: 140 MMOL/L (ref 136–145)
SPECIMEN EXP DATE BLD: NORMAL
UNIT DIVISION: 0
UNIT ISSUE DATE/TIME: NORMAL
WBC # BLD AUTO: 5.7 K/UL (ref 4.3–11.1)

## 2025-06-24 PROCEDURE — G0378 HOSPITAL OBSERVATION PER HR: HCPCS

## 2025-06-24 PROCEDURE — 6370000000 HC RX 637 (ALT 250 FOR IP): Performed by: FAMILY MEDICINE

## 2025-06-24 PROCEDURE — 85014 HEMATOCRIT: CPT

## 2025-06-24 PROCEDURE — 6370000000 HC RX 637 (ALT 250 FOR IP): Performed by: PHYSICIAN ASSISTANT

## 2025-06-24 PROCEDURE — 36415 COLL VENOUS BLD VENIPUNCTURE: CPT

## 2025-06-24 PROCEDURE — 80048 BASIC METABOLIC PNL TOTAL CA: CPT

## 2025-06-24 PROCEDURE — 2500000003 HC RX 250 WO HCPCS: Performed by: FAMILY MEDICINE

## 2025-06-24 PROCEDURE — 85025 COMPLETE CBC W/AUTO DIFF WBC: CPT

## 2025-06-24 PROCEDURE — 82962 GLUCOSE BLOOD TEST: CPT

## 2025-06-24 PROCEDURE — 85018 HEMOGLOBIN: CPT

## 2025-06-24 RX ORDER — POLYETHYLENE GLYCOL 3350 17 G/17G
17 POWDER, FOR SOLUTION ORAL DAILY
Qty: 30 PACKET | Refills: 0 | Status: SHIPPED | OUTPATIENT
Start: 2025-06-25 | End: 2025-07-25

## 2025-06-24 RX ORDER — PANTOPRAZOLE SODIUM 40 MG/1
40 TABLET, DELAYED RELEASE ORAL
Status: DISCONTINUED | OUTPATIENT
Start: 2025-06-24 | End: 2025-06-24 | Stop reason: HOSPADM

## 2025-06-24 RX ORDER — PSEUDOEPHEDRINE HCL 30 MG
100 TABLET ORAL 2 TIMES DAILY
Qty: 60 CAPSULE | Refills: 0 | Status: SHIPPED | OUTPATIENT
Start: 2025-06-24

## 2025-06-24 RX ORDER — PANTOPRAZOLE SODIUM 40 MG/1
40 TABLET, DELAYED RELEASE ORAL DAILY
Qty: 30 TABLET | Refills: 0 | Status: SHIPPED | OUTPATIENT
Start: 2025-06-24

## 2025-06-24 RX ADMIN — DILTIAZEM HYDROCHLORIDE 120 MG: 120 CAPSULE, EXTENDED RELEASE ORAL at 08:48

## 2025-06-24 RX ADMIN — INSULIN LISPRO 1 UNITS: 100 INJECTION, SOLUTION INTRAVENOUS; SUBCUTANEOUS at 12:00

## 2025-06-24 RX ADMIN — TAMSULOSIN HYDROCHLORIDE 0.8 MG: 0.4 CAPSULE ORAL at 08:48

## 2025-06-24 RX ADMIN — ROSUVASTATIN CALCIUM 20 MG: 10 TABLET, FILM COATED ORAL at 08:48

## 2025-06-24 RX ADMIN — PANTOPRAZOLE SODIUM 40 MG: 40 TABLET, DELAYED RELEASE ORAL at 16:16

## 2025-06-24 RX ADMIN — SODIUM CHLORIDE, PRESERVATIVE FREE 10 ML: 5 INJECTION INTRAVENOUS at 08:40

## 2025-06-24 RX ADMIN — DOCUSATE SODIUM 100 MG: 100 CAPSULE, LIQUID FILLED ORAL at 08:48

## 2025-06-24 RX ADMIN — LOSARTAN POTASSIUM 50 MG: 50 TABLET, FILM COATED ORAL at 08:48

## 2025-06-24 RX ADMIN — NADOLOL 40 MG: 40 TABLET ORAL at 08:48

## 2025-06-24 RX ADMIN — INSULIN LISPRO 1 UNITS: 100 INJECTION, SOLUTION INTRAVENOUS; SUBCUTANEOUS at 08:44

## 2025-06-24 NOTE — CARE COORDINATION
Assessment competed with patient at bedside. Patent lives with spouse, daughter and son in law. Patient uses a 3 wheeled walker at baseline. He does not drive, family assist with transportation. Demographics and PCP confirmed. CM discussed home health recommendation, patient declines. Patient likely to discharge home today, pending a HGB recheck at 1400.     Angel HARGROVE, KATE  [place]            06/24/25 1212   Service Assessment   Patient Orientation Alert and Oriented   Cognition Alert   History Provided By Patient   Primary Caregiver Self   Support Systems Children;Spouse/Significant Other;Family Members   Patient's Healthcare Decision Maker is: Legal Next of Kin   PCP Verified by CM Yes   Prior Functional Level Independent in ADLs/IADLs   Current Functional Level Independent in ADLs/IADLs   Can patient return to prior living arrangement Yes   Ability to make needs known: Good   Family able to assist with home care needs: Yes   Would you like for me to discuss the discharge plan with any other family members/significant others, and if so, who? Yes  (spouse)   Condition of Participation: Discharge Planning   The Plan for Transition of Care is related to the following treatment goals: return home   The Patient and/or Patient Representative was provided with a Choice of Provider? Patient   The Patient and/Or Patient Representative agree with the Discharge Plan? Yes   Freedom of Choice list was provided with basic dialogue that supports the patient's individualized plan of care/goals, treatment preferences, and shares the quality data associated with the providers?  Yes

## 2025-06-24 NOTE — PROGRESS NOTES
Pt resting in bed at present time without complaints. No BM reported during this shift. Rounds performed, all needs met this shift. Bed locked and low, call light within reach. Will give report to oncoming day shift nurse.

## 2025-06-24 NOTE — DISCHARGE SUMMARY
Hospitalist Discharge Summary   Admit Date:  2025  7:19 PM   DC Note date: 2025  Name:  Can Land   Age:  79 y.o.  Sex:  male  :  1945   MRN:  [id number]   Room:  [facility]  PCP:  Kiana Meléndez APRN    Presenting Complaint: Rectal Bleeding     Initial Admission Diagnosis: Melena [K92.1]  Rectal bleeding [K62.5]  Chronic anticoagulation [Z79.01]  Paroxysmal A-fib (HCC) [I48.0]     Problem List for this Hospitalization (present on admission):    Principal Problem:    Melena  Active Problems:    CKD stage 3a, GFR 45-59 ml/min (HCC)    Paroxysmal atrial fibrillation (HCC)    Primary hypertension    Hyperlipidemia    Diabetes mellitus type 2 in nonobese (HCC)    Rectal bleeding  Resolved Problems:    * No resolved hospital problems. *      Hospital Course:  Can Land is a 79 y.o. male with medical history of atrial fibrillation on apixaban, hypertension, HLD, DM2, CKD 3a, anxiety, BPH c/o melenotic stooling x 2 days.  Patient has associated dizziness and weakness. He denied hx of GIB, gastric ulcers; further denied hematemesis, coffee-grind emesis, hematochezia.     In ER, VS with T97.2, HR 77, RR 17, /93, 99% RA.  Labwork remarkable for CO2 19, creatinine 1.54.  H/h 13.0 / 40.4 respectively on CBC.  Hospitalist consulted for inpatient admission.      Pt taken to EGD / colonoscopy by Dr. Chahal and found with internal hemorrhoids without active bleeding.  Per GI okay to resume apixaban evening 2025.  Patient reported to  thereafter with clots / tarry stooling; hgb dropped to 6.6 and transfusion ordered 2025.  Apixaban held, GI took patient for flex sigmoidoscopy with hemorrhoidal banding.  Per Dr. Hull (GI), start colace bid and miralax daily.  Post transfusion, hgb trends 6.6 -> 9.1 -> 8.9 -> 8.3 -> 7.9 -> 8.8.  Patient tells me he is not willing to take apixaban anymore despite understanding that underlying atrial fibrillation will place him at high risk for clot formation

## (undated) DEVICE — LIGATOR ENDOSCP DIA8.6-11.5MM MULT DISP SPDBND LIGATOR SUP

## (undated) DEVICE — CONNECTOR TBNG OD5-7MM O2 END DISP

## (undated) DEVICE — YANKAUER,BULB TIP,W/O VENT,RIGID,STERILE: Brand: MEDLINE

## (undated) DEVICE — GAUZE,SPONGE,4"X4",12PLY,WOVEN,NS,LF: Brand: MEDLINE

## (undated) DEVICE — BALLOON US E LIN RNG O KT FOR FG-32UA

## (undated) DEVICE — SOLUTION IRRIG 1000ML H2O PIC PLAS SHATTERPROOF CONTAINER

## (undated) DEVICE — AIRLIFE™ OXYGEN TUBING 7 FEET (2.1 M) CRUSH RESISTANT OXYGEN TUBING, VINYL TIPPED: Brand: AIRLIFE™

## (undated) DEVICE — KENDALL RADIOLUCENT FOAM MONITORING ELECTRODE RECTANGULAR SHAPE: Brand: KENDALL

## (undated) DEVICE — SYRINGE MEDICAL 3ML CLEAR PLASTIC STANDARD NON CONTROL LUERLOCK TIP DISPOSABLE

## (undated) DEVICE — MOUTHPIECE ENDOSCP L CTRL OPN AND SIDE PORTS DISP

## (undated) DEVICE — BLOCK BITE AD 60FR W/ VELC STRP ADDRESSES MOST PT AND

## (undated) DEVICE — SINGLE PORT MANIFOLD: Brand: NEPTUNE 2

## (undated) DEVICE — ENDOSCOPIC KIT 1.1+ OP4 CA DE 2 GWN AAMI LEVEL 3

## (undated) DEVICE — NEEDLE SYRINGE 18GA L1.5IN RED PLAS HUB S STL BLNT FILL W/O

## (undated) DEVICE — GLOVE ORTHO 8   MSG9480

## (undated) DEVICE — DISPOSABLE BIOPSY VALVE MAJ-1555: Brand: SINGLE USE BIOPSY VALVE (STERILE)

## (undated) DEVICE — SYRINGE MED 10ML LUERLOCK TIP W/O SFTY DISP